# Patient Record
Sex: MALE | Race: WHITE | NOT HISPANIC OR LATINO | URBAN - METROPOLITAN AREA
[De-identification: names, ages, dates, MRNs, and addresses within clinical notes are randomized per-mention and may not be internally consistent; named-entity substitution may affect disease eponyms.]

---

## 2017-09-01 ENCOUNTER — OUTPATIENT (OUTPATIENT)
Dept: OUTPATIENT SERVICES | Facility: HOSPITAL | Age: 22
LOS: 1 days | End: 2017-09-01
Payer: MEDICAID

## 2017-09-05 DIAGNOSIS — R69 ILLNESS, UNSPECIFIED: ICD-10-CM

## 2017-09-27 ENCOUNTER — RESULT REVIEW (OUTPATIENT)
Age: 22
End: 2017-09-27

## 2017-09-27 ENCOUNTER — INPATIENT (INPATIENT)
Facility: HOSPITAL | Age: 22
LOS: 4 days | Discharge: HOME | DRG: 853 | End: 2017-10-02
Attending: SURGERY | Admitting: SURGERY
Payer: MEDICAID

## 2017-09-27 VITALS
OXYGEN SATURATION: 98 % | DIASTOLIC BLOOD PRESSURE: 79 MMHG | RESPIRATION RATE: 17 BRPM | TEMPERATURE: 101 F | SYSTOLIC BLOOD PRESSURE: 126 MMHG | HEART RATE: 147 BPM

## 2017-09-27 DIAGNOSIS — K37 UNSPECIFIED APPENDICITIS: ICD-10-CM

## 2017-09-27 LAB
ALBUMIN SERPL ELPH-MCNC: 4.2 G/DL — SIGNIFICANT CHANGE UP (ref 3.3–5)
ALP SERPL-CCNC: 123 U/L — HIGH (ref 40–120)
ALT FLD-CCNC: 17 U/L RC — SIGNIFICANT CHANGE UP (ref 10–45)
ANION GAP SERPL CALC-SCNC: 14 MMOL/L — SIGNIFICANT CHANGE UP (ref 5–17)
APPEARANCE UR: CLEAR — SIGNIFICANT CHANGE UP
APTT BLD: 26.3 SEC — LOW (ref 27.5–37.4)
AST SERPL-CCNC: 17 U/L — SIGNIFICANT CHANGE UP (ref 10–40)
BASE EXCESS BLDV CALC-SCNC: 0.6 MMOL/L — SIGNIFICANT CHANGE UP (ref -2–2)
BASOPHILS # BLD AUTO: 0 K/UL — SIGNIFICANT CHANGE UP (ref 0–0.2)
BILIRUB SERPL-MCNC: 0.9 MG/DL — SIGNIFICANT CHANGE UP (ref 0.2–1.2)
BILIRUB UR-MCNC: NEGATIVE — SIGNIFICANT CHANGE UP
BLD GP AB SCN SERPL QL: NEGATIVE — SIGNIFICANT CHANGE UP
BUN SERPL-MCNC: 10 MG/DL — SIGNIFICANT CHANGE UP (ref 7–23)
CA-I SERPL-SCNC: 1.16 MMOL/L — SIGNIFICANT CHANGE UP (ref 1.12–1.3)
CALCIUM SERPL-MCNC: 9.1 MG/DL — SIGNIFICANT CHANGE UP (ref 8.4–10.5)
CHLORIDE BLDV-SCNC: 104 MMOL/L — SIGNIFICANT CHANGE UP (ref 96–108)
CHLORIDE SERPL-SCNC: 101 MMOL/L — SIGNIFICANT CHANGE UP (ref 96–108)
CO2 BLDV-SCNC: 25 MMOL/L — SIGNIFICANT CHANGE UP (ref 22–30)
CO2 SERPL-SCNC: 23 MMOL/L — SIGNIFICANT CHANGE UP (ref 22–31)
COLOR SPEC: YELLOW — SIGNIFICANT CHANGE UP
CREAT SERPL-MCNC: 1.1 MG/DL — SIGNIFICANT CHANGE UP (ref 0.5–1.3)
DIFF PNL FLD: NEGATIVE — SIGNIFICANT CHANGE UP
EOSINOPHIL # BLD AUTO: 0 K/UL — SIGNIFICANT CHANGE UP (ref 0–0.5)
GAS PNL BLDV: 136 MMOL/L — SIGNIFICANT CHANGE UP (ref 136–145)
GAS PNL BLDV: SIGNIFICANT CHANGE UP
GLUCOSE BLDV-MCNC: 154 MG/DL — HIGH (ref 70–99)
GLUCOSE SERPL-MCNC: 154 MG/DL — HIGH (ref 70–99)
GLUCOSE UR QL: NEGATIVE MG/DL — SIGNIFICANT CHANGE UP
HCO3 BLDV-SCNC: 24 MMOL/L — SIGNIFICANT CHANGE UP (ref 21–29)
HCT VFR BLD CALC: 43.9 % — SIGNIFICANT CHANGE UP (ref 39–50)
HCT VFR BLDA CALC: 43 % — SIGNIFICANT CHANGE UP (ref 39–50)
HGB BLD CALC-MCNC: 14.1 G/DL — SIGNIFICANT CHANGE UP (ref 13–17)
HGB BLD-MCNC: 14.3 G/DL — SIGNIFICANT CHANGE UP (ref 13–17)
INR BLD: 1.32 RATIO — HIGH (ref 0.88–1.16)
KETONES UR-MCNC: NEGATIVE — SIGNIFICANT CHANGE UP
LACTATE BLDV-MCNC: 2.7 MMOL/L — HIGH (ref 0.7–2)
LEUKOCYTE ESTERASE UR-ACNC: NEGATIVE — SIGNIFICANT CHANGE UP
LG PLATELETS BLD QL AUTO: SLIGHT — SIGNIFICANT CHANGE UP
LIDOCAIN IGE QN: 17 U/L — SIGNIFICANT CHANGE UP (ref 7–60)
LYMPHOCYTES # BLD AUTO: 0.4 K/UL — LOW (ref 1–3.3)
LYMPHOCYTES # BLD AUTO: 7 % — LOW (ref 13–44)
MCHC RBC-ENTMCNC: 30.7 PG — SIGNIFICANT CHANGE UP (ref 27–34)
MCHC RBC-ENTMCNC: 32.5 GM/DL — SIGNIFICANT CHANGE UP (ref 32–36)
MCV RBC AUTO: 94.3 FL — SIGNIFICANT CHANGE UP (ref 80–100)
MONOCYTES # BLD AUTO: 0.1 K/UL — SIGNIFICANT CHANGE UP (ref 0–0.9)
NEUTROPHILS # BLD AUTO: 7.2 K/UL — SIGNIFICANT CHANGE UP (ref 1.8–7.4)
NEUTROPHILS NFR BLD AUTO: 84 % — HIGH (ref 43–77)
NEUTS BAND # BLD: 9 % — HIGH (ref 0–8)
NITRITE UR-MCNC: NEGATIVE — SIGNIFICANT CHANGE UP
PCO2 BLDV: 38 MMHG — SIGNIFICANT CHANGE UP (ref 35–50)
PH BLDV: 7.43 — SIGNIFICANT CHANGE UP (ref 7.35–7.45)
PH UR: 6.5 — SIGNIFICANT CHANGE UP (ref 5–8)
PLAT MORPH BLD: NORMAL — SIGNIFICANT CHANGE UP
PLATELET # BLD AUTO: 124 K/UL — LOW (ref 150–400)
PO2 BLDV: 38 MMHG — SIGNIFICANT CHANGE UP (ref 25–45)
POTASSIUM BLDV-SCNC: 3.6 MMOL/L — SIGNIFICANT CHANGE UP (ref 3.5–5)
POTASSIUM SERPL-MCNC: 3.9 MMOL/L — SIGNIFICANT CHANGE UP (ref 3.5–5.3)
POTASSIUM SERPL-SCNC: 3.9 MMOL/L — SIGNIFICANT CHANGE UP (ref 3.5–5.3)
PROT SERPL-MCNC: 7.8 G/DL — SIGNIFICANT CHANGE UP (ref 6–8.3)
PROT UR-MCNC: NEGATIVE MG/DL — SIGNIFICANT CHANGE UP
PROTHROM AB SERPL-ACNC: 14.5 SEC — HIGH (ref 9.8–12.7)
RBC # BLD: 4.65 M/UL — SIGNIFICANT CHANGE UP (ref 4.2–5.8)
RBC # FLD: 11.9 % — SIGNIFICANT CHANGE UP (ref 10.3–14.5)
RBC BLD AUTO: NORMAL — SIGNIFICANT CHANGE UP
RH IG SCN BLD-IMP: POSITIVE — SIGNIFICANT CHANGE UP
SAO2 % BLDV: 71 % — SIGNIFICANT CHANGE UP (ref 67–88)
SODIUM SERPL-SCNC: 138 MMOL/L — SIGNIFICANT CHANGE UP (ref 135–145)
SP GR SPEC: 1.05 — HIGH (ref 1.01–1.02)
UROBILINOGEN FLD QL: NEGATIVE MG/DL — SIGNIFICANT CHANGE UP
WBC # BLD: 7.7 K/UL — SIGNIFICANT CHANGE UP (ref 3.8–10.5)
WBC # FLD AUTO: 7.7 K/UL — SIGNIFICANT CHANGE UP (ref 3.8–10.5)

## 2017-09-27 PROCEDURE — 88304 TISSUE EXAM BY PATHOLOGIST: CPT | Mod: 26

## 2017-09-27 PROCEDURE — 88360 TUMOR IMMUNOHISTOCHEM/MANUAL: CPT | Mod: 26

## 2017-09-27 PROCEDURE — 74177 CT ABD & PELVIS W/CONTRAST: CPT | Mod: 26

## 2017-09-27 PROCEDURE — 99285 EMERGENCY DEPT VISIT HI MDM: CPT | Mod: 25

## 2017-09-27 PROCEDURE — 88342 IMHCHEM/IMCYTCHM 1ST ANTB: CPT | Mod: 26,59

## 2017-09-27 PROCEDURE — 44970 LAPAROSCOPY APPENDECTOMY: CPT

## 2017-09-27 PROCEDURE — 88305 TISSUE EXAM BY PATHOLOGIST: CPT | Mod: 26

## 2017-09-27 RX ORDER — ONDANSETRON 8 MG/1
4 TABLET, FILM COATED ORAL EVERY 4 HOURS
Qty: 0 | Refills: 0 | Status: DISCONTINUED | OUTPATIENT
Start: 2017-09-27 | End: 2017-09-27

## 2017-09-27 RX ORDER — ACETAMINOPHEN 500 MG
1000 TABLET ORAL ONCE
Qty: 0 | Refills: 0 | Status: COMPLETED | OUTPATIENT
Start: 2017-09-27 | End: 2017-09-27

## 2017-09-27 RX ORDER — SODIUM CHLORIDE 9 MG/ML
1000 INJECTION INTRAMUSCULAR; INTRAVENOUS; SUBCUTANEOUS ONCE
Qty: 0 | Refills: 0 | Status: COMPLETED | OUTPATIENT
Start: 2017-09-27 | End: 2017-09-27

## 2017-09-27 RX ORDER — HEPARIN SODIUM 5000 [USP'U]/ML
5000 INJECTION INTRAVENOUS; SUBCUTANEOUS EVERY 8 HOURS
Qty: 0 | Refills: 0 | Status: DISCONTINUED | OUTPATIENT
Start: 2017-09-27 | End: 2017-09-27

## 2017-09-27 RX ORDER — OXYCODONE AND ACETAMINOPHEN 5; 325 MG/1; MG/1
2 TABLET ORAL EVERY 6 HOURS
Qty: 0 | Refills: 0 | Status: DISCONTINUED | OUTPATIENT
Start: 2017-09-27 | End: 2017-10-02

## 2017-09-27 RX ORDER — SODIUM CHLORIDE 9 MG/ML
1000 INJECTION, SOLUTION INTRAVENOUS
Qty: 0 | Refills: 0 | Status: DISCONTINUED | OUTPATIENT
Start: 2017-09-27 | End: 2017-09-28

## 2017-09-27 RX ORDER — ONDANSETRON 8 MG/1
4 TABLET, FILM COATED ORAL ONCE
Qty: 0 | Refills: 0 | Status: COMPLETED | OUTPATIENT
Start: 2017-09-27 | End: 2017-09-27

## 2017-09-27 RX ORDER — MORPHINE SULFATE 50 MG/1
4 CAPSULE, EXTENDED RELEASE ORAL ONCE
Qty: 0 | Refills: 0 | Status: DISCONTINUED | OUTPATIENT
Start: 2017-09-27 | End: 2017-09-27

## 2017-09-27 RX ORDER — OXYCODONE AND ACETAMINOPHEN 5; 325 MG/1; MG/1
1 TABLET ORAL EVERY 4 HOURS
Qty: 0 | Refills: 0 | Status: DISCONTINUED | OUTPATIENT
Start: 2017-09-27 | End: 2017-10-02

## 2017-09-27 RX ORDER — HYDROMORPHONE HYDROCHLORIDE 2 MG/ML
0.25 INJECTION INTRAMUSCULAR; INTRAVENOUS; SUBCUTANEOUS
Qty: 0 | Refills: 0 | Status: DISCONTINUED | OUTPATIENT
Start: 2017-09-27 | End: 2017-09-28

## 2017-09-27 RX ORDER — CEFOTETAN DISODIUM 1 G
2 VIAL (EA) INJECTION EVERY 12 HOURS
Qty: 0 | Refills: 0 | Status: DISCONTINUED | OUTPATIENT
Start: 2017-09-27 | End: 2017-09-27

## 2017-09-27 RX ORDER — ENOXAPARIN SODIUM 100 MG/ML
40 INJECTION SUBCUTANEOUS EVERY 24 HOURS
Qty: 0 | Refills: 0 | Status: DISCONTINUED | OUTPATIENT
Start: 2017-09-27 | End: 2017-10-02

## 2017-09-27 RX ORDER — HYDROMORPHONE HYDROCHLORIDE 2 MG/ML
0.2 INJECTION INTRAMUSCULAR; INTRAVENOUS; SUBCUTANEOUS
Qty: 0 | Refills: 0 | Status: DISCONTINUED | OUTPATIENT
Start: 2017-09-27 | End: 2017-09-27

## 2017-09-27 RX ORDER — PIPERACILLIN AND TAZOBACTAM 4; .5 G/20ML; G/20ML
3.38 INJECTION, POWDER, LYOPHILIZED, FOR SOLUTION INTRAVENOUS EVERY 8 HOURS
Qty: 0 | Refills: 0 | Status: DISCONTINUED | OUTPATIENT
Start: 2017-09-27 | End: 2017-09-28

## 2017-09-27 RX ORDER — SODIUM CHLORIDE 9 MG/ML
1000 INJECTION, SOLUTION INTRAVENOUS
Qty: 0 | Refills: 0 | Status: DISCONTINUED | OUTPATIENT
Start: 2017-09-27 | End: 2017-09-27

## 2017-09-27 RX ORDER — PIPERACILLIN AND TAZOBACTAM 4; .5 G/20ML; G/20ML
3.38 INJECTION, POWDER, LYOPHILIZED, FOR SOLUTION INTRAVENOUS ONCE
Qty: 0 | Refills: 0 | Status: COMPLETED | OUTPATIENT
Start: 2017-09-27 | End: 2017-09-27

## 2017-09-27 RX ORDER — MORPHINE SULFATE 50 MG/1
2 CAPSULE, EXTENDED RELEASE ORAL EVERY 4 HOURS
Qty: 0 | Refills: 0 | Status: DISCONTINUED | OUTPATIENT
Start: 2017-09-27 | End: 2017-09-27

## 2017-09-27 RX ADMIN — Medication 400 MILLIGRAM(S): at 21:30

## 2017-09-27 RX ADMIN — PIPERACILLIN AND TAZOBACTAM 25 GRAM(S): 4; .5 INJECTION, POWDER, LYOPHILIZED, FOR SOLUTION INTRAVENOUS at 21:51

## 2017-09-27 RX ADMIN — ONDANSETRON 4 MILLIGRAM(S): 8 TABLET, FILM COATED ORAL at 08:27

## 2017-09-27 RX ADMIN — ENOXAPARIN SODIUM 40 MILLIGRAM(S): 100 INJECTION SUBCUTANEOUS at 21:30

## 2017-09-27 RX ADMIN — OXYCODONE AND ACETAMINOPHEN 2 TABLET(S): 5; 325 TABLET ORAL at 20:32

## 2017-09-27 RX ADMIN — MORPHINE SULFATE 4 MILLIGRAM(S): 50 CAPSULE, EXTENDED RELEASE ORAL at 08:40

## 2017-09-27 RX ADMIN — HYDROMORPHONE HYDROCHLORIDE 0.25 MILLIGRAM(S): 2 INJECTION INTRAMUSCULAR; INTRAVENOUS; SUBCUTANEOUS at 17:15

## 2017-09-27 RX ADMIN — PIPERACILLIN AND TAZOBACTAM 200 GRAM(S): 4; .5 INJECTION, POWDER, LYOPHILIZED, FOR SOLUTION INTRAVENOUS at 08:47

## 2017-09-27 RX ADMIN — HYDROMORPHONE HYDROCHLORIDE 0.25 MILLIGRAM(S): 2 INJECTION INTRAMUSCULAR; INTRAVENOUS; SUBCUTANEOUS at 17:03

## 2017-09-27 RX ADMIN — HYDROMORPHONE HYDROCHLORIDE 0.25 MILLIGRAM(S): 2 INJECTION INTRAMUSCULAR; INTRAVENOUS; SUBCUTANEOUS at 17:32

## 2017-09-27 RX ADMIN — SODIUM CHLORIDE 2000 MILLILITER(S): 9 INJECTION INTRAMUSCULAR; INTRAVENOUS; SUBCUTANEOUS at 08:20

## 2017-09-27 RX ADMIN — SODIUM CHLORIDE 125 MILLILITER(S): 9 INJECTION, SOLUTION INTRAVENOUS at 10:58

## 2017-09-27 RX ADMIN — OXYCODONE AND ACETAMINOPHEN 2 TABLET(S): 5; 325 TABLET ORAL at 20:02

## 2017-09-27 RX ADMIN — MORPHINE SULFATE 4 MILLIGRAM(S): 50 CAPSULE, EXTENDED RELEASE ORAL at 08:27

## 2017-09-27 RX ADMIN — Medication 400 MILLIGRAM(S): at 08:20

## 2017-09-27 NOTE — PROGRESS NOTE ADULT - SUBJECTIVE AND OBJECTIVE BOX
STATUS POST:  Lap appy    SUBJECTIVE: Pt seen  SOB:  [ ] YES [x ] NO  Chest Discomfort: [ ] YES [ x] NO    Nausea: [ ] YES [ x] NO           Vomiting: [ ] YES [x ] NO  Flatus: [ ] YES [x ] NO             Bowel Movement: [ ] YES [ x] NO  Diarrhea: [ ] YES [ x] NO                  Pain Control Adequate: [x ] YES [ ] NO  Yun: x  NGT: x    Vital Signs Last 24 Hrs  T(C): 37.6 (27 Sep 2017 19:40), Max: 39.5 (27 Sep 2017 08:14)  T(F): 99.6 (27 Sep 2017 19:40), Max: 103.1 (27 Sep 2017 08:14)  HR: 98 (27 Sep 2017 19:40) (74 - 147)  BP: 111/75 (27 Sep 2017 19:40) (103/69 - 134/68)  BP(mean): 85 (27 Sep 2017 18:15) (82 - 92)  RR: 18 (27 Sep 2017 19:40) (14 - 20)  SpO2: 97% (27 Sep 2017 19:40) (96% - 100%)  I&O's Summary    27 Sep 2017 07:  -  27 Sep 2017 20:15  --------------------------------------------------------  IN: 401 mL / OUT: 500 mL / NET: -99 mL      I&O's Detail    27 Sep 2017 07:  -  27 Sep 2017 20:15  --------------------------------------------------------  IN:    lactated ringers.: 300 mL    Oral Fluid: 101 mL  Total IN: 401 mL    OUT:    Voided: 500 mL  Total OUT: 500 mL    Total NET: -99 mL          MEDICATIONS  (STANDING):  enoxaparin Injectable 40 milliGRAM(s) SubCutaneous every 24 hours  lactated ringers. 1000 milliLiter(s) (100 mL/Hr) IV Continuous <Continuous>  piperacillin/tazobactam IVPB. 3.375 Gram(s) IV Intermittent every 8 hours    MEDICATIONS  (PRN):  oxyCODONE    5 mG/acetaminophen 325 mG 1 Tablet(s) Oral every 4 hours PRN Moderate Pain  oxyCODONE    5 mG/acetaminophen 325 mG 2 Tablet(s) Oral every 6 hours PRN Severe Pain  HYDROmorphone  Injectable 0.25 milliGRAM(s) IV Push every 10 minutes PRN Moderate Pain      LABS:                        14.3   7.7   )-----------( 124      ( 27 Sep 2017 08:30 )             43.9         138  |  101  |  10  ----------------------------<  154<H>  3.9   |  23  |  1.10    Ca    9.1      27 Sep 2017 08:30    TPro  7.8  /  Alb  4.2  /  TBili  0.9  /  DBili  x   /  AST  17  /  ALT  17  /  AlkPhos  123<H>      PT/INR - ( 27 Sep 2017 08:30 )   PT: 14.5 sec;   INR: 1.32 ratio         PTT - ( 27 Sep 2017 08:30 )  PTT:26.3 sec  Urinalysis Basic - ( 27 Sep 2017 12:41 )    Color: Yellow / Appearance: Clear / S.050 / pH: x  Gluc: x / Ketone: Negative  / Bili: Negative / Urobili: Negative mg/dL   Blood: x / Protein: Negative mg/dL / Nitrite: Negative   Leuk Esterase: Negative / RBC: x / WBC x   Sq Epi: x / Non Sq Epi: x / Bacteria: x    Physical Exam:    General: WN/WD NAD  Neurology: A&Ox3, nonfocal, follows commands  Eyes: EOMI  Abdominal: Obese, Soft, NT, ND,   Extremities: No edema, + peripheral pulses  Skin: No Rashes, Hematoma, Ecchymosis  Incisions: Laparoscopic incisions w/ dressings in place. Umbilical incision w/ moderate serosanguinous staining      A/P: 22y Male s/p the above procedure doing well. Pain control adequate. Has been tolerating a CLD    - Pain control  - Diet: CLD; advance in AM  - Activity: OOB as tolerated  - Labs: AM Labs  - DVT ppx  - Monitor GI and  function  - Dispo: Floor; discharge tomorrow if afebrile, tolerating regular diet, and pain control adequate    Rick Black M.D.

## 2017-09-27 NOTE — H&P ADULT - NSHPLABSRESULTS_GEN_ALL_CORE
14.3   7.7   )-----------( 124      ( 27 Sep 2017 08:30 )             43.9   09-27    138  |  101  |  10  ----------------------------<  154<H>  3.9   |  23  |  1.10    Ca    9.1      27 Sep 2017 08:30    TPro  7.8  /  Alb  4.2  /  TBili  0.9  /  DBili  x   /  AST  17  /  ALT  17  /  AlkPhos  123<H>  09-27

## 2017-09-27 NOTE — H&P ADULT - ASSESSMENT
22 year old male with acute appendicitis   -Admit to OR under under Dr. Cid for laparoscopic appendectomy, possible open  -Patient consented and in the chart  -NPO, IV fluids, IV antibiotics  -CT scan reviewed with radiology shows evidence of acute appendicitis with fecolith without signs of perforation  -Heparin SubQ for DVT prophylaxis

## 2017-09-27 NOTE — ED PROVIDER NOTE - ATTENDING CONTRIBUTION TO CARE
Private Physician "Can't recall name"  22y male pmh, neg, Pt comes to ed complains of abd pain onset two days ago. RLQ rad to suprapubic, Anorexia with nausea vomiting, No diarrhea, No melena/brbpr/hemetmeis. Pain worse with palp walking. PE WDWN male looking acutely ill NCAT neck supple chest clear anterior & posterior abd,. Abd rlq tender with rebound, +bs , no rug ttp. neuro no focal defects  Mac Anders MD, Facep

## 2017-09-27 NOTE — H&P ADULT - HISTORY OF PRESENT ILLNESS
22 year old male with no past medical history presents to the emergency complaining of abdominal pain.  Pain started two days ago in the epigastric region, acutely worsening last night and localizing to the right lower quadrant.  Pain is described as sharp and stabbing in nature without any aggravating or relieving factors.  Patient reports nausea and non bloody/non billious emesis starting last night.  No changes in bowel habits.  Fevers started last night, patient is currently 103.1 in the ED.

## 2017-09-27 NOTE — BRIEF OPERATIVE NOTE - OPERATION/FINDINGS
Perforation seen at base of appendix. Mild purulence in peritoneal cavity. Appendectomy performed and abdomen washed out. Umbilical skin wound left open and packed.

## 2017-09-27 NOTE — ED PROVIDER NOTE - OBJECTIVE STATEMENT
22yom no pmhx here with RLQ pain since 2 days worsening along with fever and chills, anorexia. No nausea or vomiting. seen at [facility] 2 nights ago for gastritis.

## 2017-09-27 NOTE — ED ADULT NURSE NOTE - OBJECTIVE STATEMENT
Patient   is  alert  and  oriented x3.  Color  is  good  and  skin warm to  touch.  He  is  nausea, vomiting, fever and  lower  abdominal pain.  He  is  tachycardic  upon  to  ER  arrival  .

## 2017-09-27 NOTE — H&P ADULT - NSHPPHYSICALEXAM_GEN_ALL_CORE
General-  Sitting up in bed, appears comfortable  Chest- Breath sounds audible bilaterally; no wheezing, rales or ronchi  Cardiac- S1, S2; no murmurs, rubs or gallops  Abdomen- Soft, tender to palpation in the right lower quadrant, non distended; positive Rosvig sign  Extremities- Warm, well perfused

## 2017-09-27 NOTE — BRIEF OPERATIVE NOTE - PROCEDURE
<<-----Click on this checkbox to enter Procedure Laparoscopic appendectomy  09/27/2017    Active  CBEHR

## 2017-09-28 ENCOUNTER — TRANSCRIPTION ENCOUNTER (OUTPATIENT)
Age: 22
End: 2017-09-28

## 2017-09-28 LAB
ANION GAP SERPL CALC-SCNC: 12 MMOL/L — SIGNIFICANT CHANGE UP (ref 5–17)
BASOPHILS # BLD AUTO: 0 K/UL — SIGNIFICANT CHANGE UP (ref 0–0.2)
BASOPHILS NFR BLD AUTO: 0.1 % — SIGNIFICANT CHANGE UP (ref 0–2)
BUN SERPL-MCNC: 9 MG/DL — SIGNIFICANT CHANGE UP (ref 7–23)
CALCIUM SERPL-MCNC: 8.3 MG/DL — LOW (ref 8.4–10.5)
CHLORIDE SERPL-SCNC: 102 MMOL/L — SIGNIFICANT CHANGE UP (ref 96–108)
CO2 SERPL-SCNC: 25 MMOL/L — SIGNIFICANT CHANGE UP (ref 22–31)
CREAT SERPL-MCNC: 0.86 MG/DL — SIGNIFICANT CHANGE UP (ref 0.5–1.3)
E COLI DNA BLD POS QL NAA+NON-PROBE: SIGNIFICANT CHANGE UP
EOSINOPHIL # BLD AUTO: 0.1 K/UL — SIGNIFICANT CHANGE UP (ref 0–0.5)
EOSINOPHIL NFR BLD AUTO: 0.5 % — SIGNIFICANT CHANGE UP (ref 0–6)
GLUCOSE SERPL-MCNC: 111 MG/DL — HIGH (ref 70–99)
GRAM STN FLD: SIGNIFICANT CHANGE UP
HCT VFR BLD CALC: 39 % — SIGNIFICANT CHANGE UP (ref 39–50)
HGB BLD-MCNC: 12.8 G/DL — LOW (ref 13–17)
LYMPHOCYTES # BLD AUTO: 1 K/UL — SIGNIFICANT CHANGE UP (ref 1–3.3)
LYMPHOCYTES # BLD AUTO: 10.1 % — LOW (ref 13–44)
MCHC RBC-ENTMCNC: 31.6 PG — SIGNIFICANT CHANGE UP (ref 27–34)
MCHC RBC-ENTMCNC: 32.7 GM/DL — SIGNIFICANT CHANGE UP (ref 32–36)
MCV RBC AUTO: 96.5 FL — SIGNIFICANT CHANGE UP (ref 80–100)
METHOD TYPE: SIGNIFICANT CHANGE UP
MONOCYTES # BLD AUTO: 0.7 K/UL — SIGNIFICANT CHANGE UP (ref 0–0.9)
MONOCYTES NFR BLD AUTO: 7.5 % — SIGNIFICANT CHANGE UP (ref 2–14)
NEUTROPHILS # BLD AUTO: 8.2 K/UL — HIGH (ref 1.8–7.4)
NEUTROPHILS NFR BLD AUTO: 81.9 % — HIGH (ref 43–77)
PLATELET # BLD AUTO: 103 K/UL — LOW (ref 150–400)
POTASSIUM SERPL-MCNC: 3.9 MMOL/L — SIGNIFICANT CHANGE UP (ref 3.5–5.3)
POTASSIUM SERPL-SCNC: 3.9 MMOL/L — SIGNIFICANT CHANGE UP (ref 3.5–5.3)
RBC # BLD: 4.04 M/UL — LOW (ref 4.2–5.8)
RBC # FLD: 11.9 % — SIGNIFICANT CHANGE UP (ref 10.3–14.5)
SODIUM SERPL-SCNC: 139 MMOL/L — SIGNIFICANT CHANGE UP (ref 135–145)
WBC # BLD: 10 K/UL — SIGNIFICANT CHANGE UP (ref 3.8–10.5)
WBC # FLD AUTO: 10 K/UL — SIGNIFICANT CHANGE UP (ref 3.8–10.5)

## 2017-09-28 RX ORDER — DOCUSATE SODIUM 100 MG
1 CAPSULE ORAL
Qty: 0 | Refills: 0 | COMMUNITY
Start: 2017-09-28

## 2017-09-28 RX ORDER — DOCUSATE SODIUM 100 MG
100 CAPSULE ORAL THREE TIMES A DAY
Qty: 0 | Refills: 0 | Status: DISCONTINUED | OUTPATIENT
Start: 2017-09-28 | End: 2017-10-02

## 2017-09-28 RX ORDER — PIPERACILLIN AND TAZOBACTAM 4; .5 G/20ML; G/20ML
3.38 INJECTION, POWDER, LYOPHILIZED, FOR SOLUTION INTRAVENOUS EVERY 8 HOURS
Qty: 0 | Refills: 0 | Status: DISCONTINUED | OUTPATIENT
Start: 2017-09-28 | End: 2017-09-30

## 2017-09-28 RX ORDER — SENNA PLUS 8.6 MG/1
2 TABLET ORAL AT BEDTIME
Qty: 0 | Refills: 0 | Status: DISCONTINUED | OUTPATIENT
Start: 2017-09-28 | End: 2017-10-02

## 2017-09-28 RX ORDER — SENNA PLUS 8.6 MG/1
2 TABLET ORAL
Qty: 0 | Refills: 0 | COMMUNITY
Start: 2017-09-28

## 2017-09-28 RX ORDER — OXYCODONE HYDROCHLORIDE 5 MG/1
1 TABLET ORAL
Qty: 30 | Refills: 0 | OUTPATIENT
Start: 2017-09-28 | End: 2017-10-05

## 2017-09-28 RX ADMIN — OXYCODONE AND ACETAMINOPHEN 1 TABLET(S): 5; 325 TABLET ORAL at 12:20

## 2017-09-28 RX ADMIN — ENOXAPARIN SODIUM 40 MILLIGRAM(S): 100 INJECTION SUBCUTANEOUS at 22:17

## 2017-09-28 RX ADMIN — Medication 100 MILLIGRAM(S): at 22:17

## 2017-09-28 RX ADMIN — Medication 100 MILLIGRAM(S): at 14:27

## 2017-09-28 RX ADMIN — OXYCODONE AND ACETAMINOPHEN 2 TABLET(S): 5; 325 TABLET ORAL at 06:21

## 2017-09-28 RX ADMIN — SENNA PLUS 2 TABLET(S): 8.6 TABLET ORAL at 22:17

## 2017-09-28 RX ADMIN — PIPERACILLIN AND TAZOBACTAM 25 GRAM(S): 4; .5 INJECTION, POWDER, LYOPHILIZED, FOR SOLUTION INTRAVENOUS at 06:34

## 2017-09-28 RX ADMIN — OXYCODONE AND ACETAMINOPHEN 2 TABLET(S): 5; 325 TABLET ORAL at 16:19

## 2017-09-28 RX ADMIN — PIPERACILLIN AND TAZOBACTAM 25 GRAM(S): 4; .5 INJECTION, POWDER, LYOPHILIZED, FOR SOLUTION INTRAVENOUS at 22:17

## 2017-09-28 RX ADMIN — OXYCODONE AND ACETAMINOPHEN 1 TABLET(S): 5; 325 TABLET ORAL at 13:10

## 2017-09-28 RX ADMIN — Medication 1 TABLET(S): at 18:22

## 2017-09-28 RX ADMIN — OXYCODONE AND ACETAMINOPHEN 2 TABLET(S): 5; 325 TABLET ORAL at 06:49

## 2017-09-28 RX ADMIN — OXYCODONE AND ACETAMINOPHEN 2 TABLET(S): 5; 325 TABLET ORAL at 17:05

## 2017-09-28 NOTE — PROGRESS NOTE ADULT - ASSESSMENT
A/P: 22y Male POD 1 lap. appendectomy. Pain control adequate. Tolerating regular diet. Voiding appropriately.    - analgesia: continue to monitor  - Diet: maintain regular diet, continue to monitor bowel fxn  - antibiotics: continue zosyn 2/2 perforated appendix + abdominal washout  - Activity: OOB as tolerated  - DVT ppx  - Dispo: possible d/c today

## 2017-09-28 NOTE — DISCHARGE NOTE ADULT - PATIENT PORTAL LINK FT
“You can access the [facility] Patient Portal, offered by [facility], by registering with the following website: [email or web address]”

## 2017-09-28 NOTE — PROGRESS NOTE ADULT - SUBJECTIVE AND OBJECTIVE BOX
Subjective:  Pt is POD 1 lap. appendectomy. Pt has not passed flatus yet but is starting to eat food. Voiding appropriately. No c/o pain.    Objective:  T(C): 37.2 (09-28-17 @ 06:11), Max: 37.8 (09-27-17 @ 20:44)  HR: 77 (09-28-17 @ 06:11) (64 - 98)  BP: 106/72 (09-28-17 @ 06:11) (103/69 - 134/68)  RR: 18 (09-28-17 @ 06:11) (14 - 18)  SpO2: 97% (09-28-17 @ 06:11) (96% - 100%)  Wt(kg): --    09-27 @ 07:01  -  09-28 @ 07:00  --------------------------------------------------------  IN:    lactated ringers.: 1500 mL    Oral Fluid: 581 mL  Total IN: 2081 mL    OUT:    Voided: 900 mL  Total OUT: 900 mL    Total NET: 1181 mL      09-28 @ 07:01  -  09-28 @ 10:41  --------------------------------------------------------  IN:    Oral Fluid: 240 mL  Total IN: 240 mL    OUT:  Total OUT: 0 mL    Total NET: 240 mL      MEDICATIONS  (STANDING):  enoxaparin Injectable 40 milliGRAM(s) SubCutaneous every 24 hours  piperacillin/tazobactam IVPB. 3.375 Gram(s) IV Intermittent every 8 hours    MEDICATIONS  (PRN):  oxyCODONE    5 mG/acetaminophen 325 mG 1 Tablet(s) Oral every 4 hours PRN Moderate Pain  oxyCODONE    5 mG/acetaminophen 325 mG 2 Tablet(s) Oral every 6 hours PRN Severe Pain      LABS:                                   12.8   10.0  )-----------( 103      ( 28 Sep 2017 07:09 )             39.0     09-28    139  |  102  |  9   ----------------------------<  111<H>  3.9   |  25  |  0.86    Ca    8.3<L>      28 Sep 2017 07:09          Physical Exam:    General: laying comfortably in bed, NAD  Abdominal: belly soft, mildly tender to palpation, no rebound  eyes: no scleral icterus   Extremities: No edema, + peripheral pulses  Skin: midline incision packed with Kerlix and covered with gauze, port sites C/D/I

## 2017-09-28 NOTE — DISCHARGE NOTE ADULT - CARE PROVIDER_API CALL
Juan Cid), Surgery; Surgical Critical Care  [address] [place] [address]  Phone: [phone]  Fax: [phone] Juan Cid), Surgery; Surgical Critical Care  [address] [place] [address]  Phone: [phone]  Fax: [phone]    Barrett Angel), Infectious Disease  [address] [place] [address]  Phone: [phone]  Fax: [phone]

## 2017-09-28 NOTE — DISCHARGE NOTE ADULT - PLAN OF CARE
Healing Activity- No heavy lifting or straining over 15 lbs for the next two weeks;  Driving- Please do not drive until your pain is well controlled and you do not need to take pain medications.  You may shower-Do not submerge or scrub incision sites.  Please pat dry incisions/dressings.  Leave the white steri strips in place, they will fall off on their own in approximately 5-7 days.  Please change packing daily with Kerlex or 1/2 inch packing.  Please remove packing before showering. Activity- No heavy lifting or straining over 15 lbs for the next two weeks;  Driving- Please do not drive until your pain is well controlled and you do not need to take pain medications.  You may shower-Do not submerge or scrub incision sites.  Please pat dry incisions/dressings.  Leave the white steri strips in place, they will fall off on their own in approximately 5-7 days.  Please change packing daily with Kerlex or 1/2 inch packing.  Please remove packing before showering.  Please follow up with Dr. Cid in 2 weeks. Contact info below.

## 2017-09-28 NOTE — DISCHARGE NOTE ADULT - INSTRUCTIONS
You may resume a regular diet. You may resume a regular diet.  Activity as tolerated.   NOTIFY YOUR SURGEON IF: You have any fever (over 100.4 F) or chills, persistent nausea/vomiting, persistent diarrhea, or if your pain is not controlled on your discharge pain medications. If you have persistent nausea, vomiting, pain, fever , unable to tolerate foods, liquids, unable to urinate , if you noticed any redness, tenderness, warmth, purulent drainage at the incision site call the doctor or go to the nearest hospital. change the dressing 2times a day keep the dressing clean, dry and intact.

## 2017-09-28 NOTE — DISCHARGE NOTE ADULT - HOSPITAL COURSE
22 year old male with no past medical history presents to the emergency complaining of abdominal pain.  Pain started two days ago in the epigastric region, acutely worsening last night and localizing to the right lower quadrant.  Pain is described as sharp and stabbing in nature without any aggravating or relieving factors.  Patient reports nausea and non bloody/non billious emesis starting last night.  No changes in bowel habits.  Fevers started last night, patient is currently 103.1 in the ED.  CT scan reviewed with radiology shows evidence of acute appendicitis with fecolith without signs of perforation. Pt went to the OR for a laparoscopic appendectomy. The patient tolerated the procedure well. There were no complications. Diet was advanced as tolerated. The patient's pain was controlled by IV pain medications and then by PO pain medications.   POD # 1, At the time of discharge, the patient was hemodynamically stable, was tolerating PO diet, was voiding urine, was ambulating, and was comfortable with adequate pain control. The patient was instructed to follow up with Dr. Cid within 1-2 weeks after discharge from the hospital. The patient/family felt comfortable with discharge. The patient was discharged to home with PO course of Abx for perforated appendicitis. The patient had no other issues.  Appendectomy performed and abdomen washed out. Umbilical skin wound left open and packed. 22 year old male with no past medical history presents to the emergency complaining of abdominal pain.  Pain started two days ago in the epigastric region, acutely worsening last night and localizing to the right lower quadrant.  Pain is described as sharp and stabbing in nature without any aggravating or relieving factors.  Patient reports nausea and non bloody/non billious emesis starting last night.  No changes in bowel habits.  Fevers started last night, patient is currently 103.1 in the ED.  CT scan reviewed with radiology shows evidence of acute appendicitis with fecolith without signs of perforation. Pt went to the OR for a laparoscopic appendectomy. The patient tolerated the procedure well. There were no complications. Diet was advanced as tolerated. The patient's pain was controlled by IV pain medications and then by PO pain medications.     Repeat CT scan revealed Status post appendectomy. Two small ill-defined fluid collections in the right lower quadrant, largest 4.0 x 2.8 x 3.0 cm. ID followed for ABX and IR was consulted but unable to drain collection.  Repeat blood cultures negative.     At the time of discharge, the patient was hemodynamically stable, was tolerating PO diet, was voiding urine, was ambulating, and was comfortable with adequate pain control. The patient was instructed to follow up with Dr. Cid within 1-2 weeks after discharge from the hospital. The patient felt comfortable with discharge. The patient was discharged to home with PO course of Abx for perforated appendicitis. The patient had no other issues.

## 2017-09-28 NOTE — DISCHARGE NOTE ADULT - OTHER SIGNIFICANT FINDINGS
EXAM:  CT ABDOMEN AND PELVIS IC                          PROCEDURE DATE:  09/27/2017      INTERPRETATION:  CLINICAL INFORMATION: Abdominal pain, fever, nausea for   2 days    COMPARISON: None.    FINDINGS:    LOWER CHEST: Within normal limits.    LIVER: Within normal limits.  BILE DUCTS: Normal caliber.  GALLBLADDER: Within normal limits.  SPLEEN: Within normal limits.  PANCREAS: Within normal limits.  ADRENALS: Within normal limits.  KIDNEYS/URETERS: Within normal limits.    BLADDER: Within normal limits.  REPRODUCTIVE ORGANS: Within normal limits.    BOWEL: Small hiatal hernia. Enlarged appendix with distal lumen measuring 1.1 cm containing intraluminal calcifications, representing an appendicolith. There is surrounding fat stranding, findings are consistent with appendicitis.   PERITONEUM: No ascites.  VESSELS:  Within normal limits.  RETROPERITONEUM: No lymphadenopathy.    ABDOMINAL WALL: Small bilateral fat-containing inguinal hernias.  BONES: Within normal limits.    IMPRESSION: Acute uncomplicated appendicitis with appendicoliths.

## 2017-09-28 NOTE — DISCHARGE NOTE ADULT - ADDITIONAL INSTRUCTIONS
Please call for a follow up appointment with your primary care medical doctor upon discharge regarding your recent surgery and hospitalization.  Please follow up with your surgeon Dr. Cid in 2 weeks. You will need a repeat CT scan to determine further Antibiotic plan.

## 2017-09-28 NOTE — DISCHARGE NOTE ADULT - MEDICATION SUMMARY - MEDICATIONS TO TAKE
I will START or STAY ON the medications listed below when I get home from the hospital:    acetaminophen-oxyCODONE 325 mg-5 mg oral tablet  -- 1-2  tab(s) by mouth every 4-6  hours, As needed for pain, MDD:8  -- Indication: For pain control    senna oral tablet  -- 2 tab(s) by mouth once a day (at bedtime)  -- Indication: For laxative    docusate sodium 100 mg oral capsule  -- 1 cap(s) by mouth 3 times a day  -- Indication: For laxative    Augmentin 875 mg-125 mg oral tablet  -- 1 tab(s) by mouth every 12 hours  -- Finish all this medication unless otherwise directed by prescriber.  Take with food or milk.    -- Indication: For Perforated Appendicitis I will START or STAY ON the medications listed below when I get home from the hospital:    metroNIDAZOLE 500 mg oral tablet  -- 1 tab(s) by mouth every 8 hours  -- Indication: For Appendicitis    acetaminophen-oxyCODONE 325 mg-5 mg oral tablet  -- 1-2  tab(s) by mouth every 4-6  hours, As needed for pain, MDD:8  -- Indication: For Pain    senna oral tablet  -- 2 tab(s) by mouth once a day (at bedtime)  -- Indication: For laxative    docusate sodium 100 mg oral capsule  -- 1 cap(s) by mouth 3 times a day  -- Indication: For laxative    levoFLOXacin 750 mg oral tablet  -- 1 tab(s) by mouth every 24 hours  -- Indication: For Appendicitis

## 2017-09-28 NOTE — DISCHARGE NOTE ADULT - CARE PLAN
Principal Discharge DX:	Acute appendicitis, unspecified acute appendicitis type  Goal:	Healing  Instructions for follow-up, activity and diet:	Activity- No heavy lifting or straining over 15 lbs for the next two weeks;  Driving- Please do not drive until your pain is well controlled and you do not need to take pain medications.  You may shower-Do not submerge or scrub incision sites.  Please pat dry incisions/dressings.  Leave the white steri strips in place, they will fall off on their own in approximately 5-7 days.  Please change packing daily with Kerlex or 1/2 inch packing.  Please remove packing before showering. Principal Discharge DX:	Acute appendicitis, unspecified acute appendicitis type  Goal:	Healing  Instructions for follow-up, activity and diet:	Activity- No heavy lifting or straining over 15 lbs for the next two weeks;  Driving- Please do not drive until your pain is well controlled and you do not need to take pain medications.  You may shower-Do not submerge or scrub incision sites.  Please pat dry incisions/dressings.  Leave the white steri strips in place, they will fall off on their own in approximately 5-7 days.  Please change packing daily with Kerlex or 1/2 inch packing.  Please remove packing before showering.  Please follow up with Dr. Cid in 2 weeks. Contact info below.

## 2017-09-29 LAB
GRAM STN FLD: SIGNIFICANT CHANGE UP
HCT VFR BLD CALC: 35 % — LOW (ref 39–50)
HGB BLD-MCNC: 11.8 G/DL — LOW (ref 13–17)
MCHC RBC-ENTMCNC: 31.7 PG — SIGNIFICANT CHANGE UP (ref 27–34)
MCHC RBC-ENTMCNC: 33.6 GM/DL — SIGNIFICANT CHANGE UP (ref 32–36)
MCV RBC AUTO: 94.6 FL — SIGNIFICANT CHANGE UP (ref 80–100)
PLATELET # BLD AUTO: 113 K/UL — LOW (ref 150–400)
RBC # BLD: 3.7 M/UL — LOW (ref 4.2–5.8)
RBC # FLD: 11.7 % — SIGNIFICANT CHANGE UP (ref 10.3–14.5)
SPECIMEN SOURCE: SIGNIFICANT CHANGE UP
WBC # BLD: 8.1 K/UL — SIGNIFICANT CHANGE UP (ref 3.8–10.5)
WBC # FLD AUTO: 8.1 K/UL — SIGNIFICANT CHANGE UP (ref 3.8–10.5)

## 2017-09-29 RX ADMIN — PIPERACILLIN AND TAZOBACTAM 25 GRAM(S): 4; .5 INJECTION, POWDER, LYOPHILIZED, FOR SOLUTION INTRAVENOUS at 21:54

## 2017-09-29 RX ADMIN — Medication 100 MILLIGRAM(S): at 05:30

## 2017-09-29 RX ADMIN — PIPERACILLIN AND TAZOBACTAM 25 GRAM(S): 4; .5 INJECTION, POWDER, LYOPHILIZED, FOR SOLUTION INTRAVENOUS at 15:18

## 2017-09-29 RX ADMIN — OXYCODONE AND ACETAMINOPHEN 2 TABLET(S): 5; 325 TABLET ORAL at 10:08

## 2017-09-29 RX ADMIN — SENNA PLUS 2 TABLET(S): 8.6 TABLET ORAL at 21:54

## 2017-09-29 RX ADMIN — OXYCODONE AND ACETAMINOPHEN 2 TABLET(S): 5; 325 TABLET ORAL at 18:46

## 2017-09-29 RX ADMIN — Medication 100 MILLIGRAM(S): at 15:18

## 2017-09-29 RX ADMIN — ENOXAPARIN SODIUM 40 MILLIGRAM(S): 100 INJECTION SUBCUTANEOUS at 21:54

## 2017-09-29 RX ADMIN — OXYCODONE AND ACETAMINOPHEN 2 TABLET(S): 5; 325 TABLET ORAL at 10:30

## 2017-09-29 RX ADMIN — OXYCODONE AND ACETAMINOPHEN 2 TABLET(S): 5; 325 TABLET ORAL at 19:30

## 2017-09-29 RX ADMIN — Medication 100 MILLIGRAM(S): at 21:54

## 2017-09-29 RX ADMIN — PIPERACILLIN AND TAZOBACTAM 25 GRAM(S): 4; .5 INJECTION, POWDER, LYOPHILIZED, FOR SOLUTION INTRAVENOUS at 05:30

## 2017-09-29 NOTE — PROGRESS NOTE ADULT - ASSESSMENT
A/P: 22y Male s/p lap. appendectomy. Pain control adequate. Tolerating regular diet. Voiding appropriately.    - Monitor GI fxn; awaiting ROBF  - Diet: maintain regular diet  - antibiotics: continue zosyn 2/2 perforated appendix + abdominal washout  - Activity: OOB as tolerated  - Pain control  - DVT ppx  - Dispo: discharge to home pending ROBF.    Rick Black M.D.

## 2017-09-29 NOTE — PROGRESS NOTE ADULT - SUBJECTIVE AND OBJECTIVE BOX
ATP Progress Note    S: No acute events overnight. Patient resting comfortably in bed. Pain well controlled. Denies fevers/chills and N/V. Denies flatus or bowel movement.    O:  T(C): 37.6 (09-29-17 @ 05:28), Max: 38.3 (09-28-17 @ 13:54)  HR: 88 (09-29-17 @ 05:28) (85 - 98)  BP: 112/72 (09-29-17 @ 05:28) (104/72 - 129/85)  RR: 18 (09-29-17 @ 05:28) (16 - 18)  SpO2: 98% (09-29-17 @ 05:28) (94% - 99%)  Wt(kg): --    09-28 @ 07:01  -  09-29 @ 07:00  --------------------------------------------------------  IN:    IV PiggyBack: 200 mL    Oral Fluid: 1760 mL  Total IN: 1960 mL    OUT:    Voided: 850 mL  Total OUT: 850 mL    Total NET: 1110 mL        CBC Full  -  ( 29 Sep 2017 06:41 )  WBC Count : 8.1 K/uL  Hemoglobin : 11.8 g/dL  Hematocrit : 35.0 %  Platelet Count - Automated : 113 K/uL  Mean Cell Volume : 94.6 fl  Mean Cell Hemoglobin : 31.7 pg  Mean Cell Hemoglobin Concentration : 33.6 gm/dL          PHYSICAL EXAM:  General: laying comfortably in bed, NAD  Abdominal: belly soft, mildly tender to palpation, no rebound  Extremities: No edema, + peripheral pulses  Skin: midline incision packed with Kerlix and covered with gauze, port sites C/D/I

## 2017-09-30 ENCOUNTER — TRANSCRIPTION ENCOUNTER (OUTPATIENT)
Age: 22
End: 2017-09-30

## 2017-09-30 LAB
-  AMIKACIN: SIGNIFICANT CHANGE UP
-  AMPICILLIN/SULBACTAM: SIGNIFICANT CHANGE UP
-  AMPICILLIN: SIGNIFICANT CHANGE UP
-  AZTREONAM: SIGNIFICANT CHANGE UP
-  CEFAZOLIN: SIGNIFICANT CHANGE UP
-  CEFEPIME: SIGNIFICANT CHANGE UP
-  CEFOXITIN: SIGNIFICANT CHANGE UP
-  CEFTAZIDIME: SIGNIFICANT CHANGE UP
-  CEFTRIAXONE: SIGNIFICANT CHANGE UP
-  CIPROFLOXACIN: SIGNIFICANT CHANGE UP
-  ERTAPENEM: SIGNIFICANT CHANGE UP
-  GENTAMICIN: SIGNIFICANT CHANGE UP
-  IMIPENEM: SIGNIFICANT CHANGE UP
-  LEVOFLOXACIN: SIGNIFICANT CHANGE UP
-  MEROPENEM: SIGNIFICANT CHANGE UP
-  PIPERACILLIN/TAZOBACTAM: SIGNIFICANT CHANGE UP
-  TOBRAMYCIN: SIGNIFICANT CHANGE UP
-  TRIMETHOPRIM/SULFAMETHOXAZOLE: SIGNIFICANT CHANGE UP
CULTURE RESULTS: SIGNIFICANT CHANGE UP
HCT VFR BLD CALC: 37.2 % — LOW (ref 39–50)
HGB BLD-MCNC: 12.2 G/DL — LOW (ref 13–17)
MCHC RBC-ENTMCNC: 31.2 PG — SIGNIFICANT CHANGE UP (ref 27–34)
MCHC RBC-ENTMCNC: 32.7 GM/DL — SIGNIFICANT CHANGE UP (ref 32–36)
MCV RBC AUTO: 95.4 FL — SIGNIFICANT CHANGE UP (ref 80–100)
METHOD TYPE: SIGNIFICANT CHANGE UP
ORGANISM # SPEC MICROSCOPIC CNT: SIGNIFICANT CHANGE UP
PLATELET # BLD AUTO: 136 K/UL — LOW (ref 150–400)
RBC # BLD: 3.9 M/UL — LOW (ref 4.2–5.8)
RBC # FLD: 11.9 % — SIGNIFICANT CHANGE UP (ref 10.3–14.5)
SPECIMEN SOURCE: SIGNIFICANT CHANGE UP
WBC # BLD: 8 K/UL — SIGNIFICANT CHANGE UP (ref 3.8–10.5)
WBC # FLD AUTO: 8 K/UL — SIGNIFICANT CHANGE UP (ref 3.8–10.5)

## 2017-09-30 PROCEDURE — 74177 CT ABD & PELVIS W/CONTRAST: CPT | Mod: 26

## 2017-09-30 PROCEDURE — 99223 1ST HOSP IP/OBS HIGH 75: CPT

## 2017-09-30 RX ORDER — MEROPENEM 1 G/30ML
1000 INJECTION INTRAVENOUS ONCE
Qty: 0 | Refills: 0 | Status: COMPLETED | OUTPATIENT
Start: 2017-09-30 | End: 2017-09-30

## 2017-09-30 RX ORDER — MEROPENEM 1 G/30ML
INJECTION INTRAVENOUS
Qty: 0 | Refills: 0 | Status: DISCONTINUED | OUTPATIENT
Start: 2017-09-30 | End: 2017-10-02

## 2017-09-30 RX ORDER — MEROPENEM 1 G/30ML
1000 INJECTION INTRAVENOUS EVERY 8 HOURS
Qty: 0 | Refills: 0 | Status: DISCONTINUED | OUTPATIENT
Start: 2017-09-30 | End: 2017-10-02

## 2017-09-30 RX ADMIN — OXYCODONE AND ACETAMINOPHEN 1 TABLET(S): 5; 325 TABLET ORAL at 17:40

## 2017-09-30 RX ADMIN — Medication 100 MILLIGRAM(S): at 05:05

## 2017-09-30 RX ADMIN — PIPERACILLIN AND TAZOBACTAM 25 GRAM(S): 4; .5 INJECTION, POWDER, LYOPHILIZED, FOR SOLUTION INTRAVENOUS at 05:05

## 2017-09-30 RX ADMIN — OXYCODONE AND ACETAMINOPHEN 1 TABLET(S): 5; 325 TABLET ORAL at 17:15

## 2017-09-30 RX ADMIN — OXYCODONE AND ACETAMINOPHEN 2 TABLET(S): 5; 325 TABLET ORAL at 22:18

## 2017-09-30 RX ADMIN — Medication 100 MILLIGRAM(S): at 21:49

## 2017-09-30 RX ADMIN — SENNA PLUS 2 TABLET(S): 8.6 TABLET ORAL at 21:49

## 2017-09-30 RX ADMIN — OXYCODONE AND ACETAMINOPHEN 2 TABLET(S): 5; 325 TABLET ORAL at 21:48

## 2017-09-30 RX ADMIN — MEROPENEM 200 MILLIGRAM(S): 1 INJECTION INTRAVENOUS at 21:49

## 2017-09-30 RX ADMIN — MEROPENEM 200 MILLIGRAM(S): 1 INJECTION INTRAVENOUS at 13:07

## 2017-09-30 RX ADMIN — ENOXAPARIN SODIUM 40 MILLIGRAM(S): 100 INJECTION SUBCUTANEOUS at 21:49

## 2017-09-30 NOTE — PROGRESS NOTE ADULT - SUBJECTIVE AND OBJECTIVE BOX
ATP Progress Note    S: No acute events overnight. Tolerating diet. +flatus/+bowel movement    O:  T(C): 37.3 (09-30-17 @ 05:37), Max: 37.8 (09-29-17 @ 20:00)  HR: 72 (09-30-17 @ 05:37) (69 - 91)  BP: 125/78 (09-30-17 @ 05:37) (109/68 - 129/82)  RR: 18 (09-30-17 @ 05:37) (16 - 18)  SpO2: 98% (09-30-17 @ 05:37) (95% - 99%)  Wt(kg): --    09-29 @ 07:01  -  09-30 @ 07:00  --------------------------------------------------------  IN:    Oral Fluid: 1020 mL  Total IN: 1020 mL    OUT:    Voided: 1800 mL  Total OUT: 1800 mL    Total NET: -780 mL        CBC Full  -  ( 30 Sep 2017 06:56 )  WBC Count : 8.0 K/uL  Hemoglobin : 12.2 g/dL  Hematocrit : 37.2 %  Platelet Count - Automated : 136 K/uL  Mean Cell Volume : 95.4 fl  Mean Cell Hemoglobin : 31.2 pg  Mean Cell Hemoglobin Concentration : 32.7 gm/dL      CAPILLARY BLOOD GLUCOSE      PHYSICAL EXAM:  General: laying comfortably in bed, NAD  Abdominal: belly soft, NT, ND  Extremities: No edema, + peripheral pulses  Skin: midline incision packed with Kerlix and covered with gauze, port sites C/D/I

## 2017-09-30 NOTE — CONSULT NOTE ADULT - SUBJECTIVE AND OBJECTIVE BOX
Patient is a 22y old  Male who presents with a chief complaint of Acute Appendicitis (28 Sep 2017 11:34)    HPI:  22 year old male with no past medical history presents to the emergency complaining of abdominal pain.  Pain started two days ago in the epigastric region, acutely worsening last night and localizing to the right lower quadrant.  Pain is described as sharp and stabbing in nature without any aggravating or relieving factors.  Patient reports nausea and non bloody/non billious emesis starting last night.  No changes in bowel habits.  Fevers started last night, patient is currently 103.1 in the ED. (27 Sep 2017 09:50)    No sick contacts. No recent travel. No recent antibiotics.     REVIEW OF SYSTEMS  All as below unless noted otherwise    General:  Denies fever and chills. 	  Ophthalmologic: Denies any visual complaints. 	  ENMT: No throat pain.  Respiratory: No cough, sputum. No shortness of breath.   Cardiovascular: No chest pain.   Gastrointestinal: No nausea or vomiting. No abdominal pain or diarrhea.   Genitourinary: No burning urine, no frequency. No flank pain  Musculoskeletal: No joint swelling or pain.   Neurological: No confusion. 	  Skin: No rash    PAST MEDICAL & SURGICAL HISTORY:  No pertinent past medical history  No significant past surgical history    FAMILY HISTORY:  No pertinent family history in first degree relatives    SOCIAL HISTORY:  non smoker      ANTIMICROBIALS:    meropenem IVPB    meropenem IVPB 1000 once  meropenem IVPB 1000 every 8 hours      OTHER MEDS:    enoxaparin Injectable 40 milliGRAM(s) SubCutaneous every 24 hours  oxyCODONE    5 mG/acetaminophen 325 mG 1 Tablet(s) Oral every 4 hours PRN  oxyCODONE    5 mG/acetaminophen 325 mG 2 Tablet(s) Oral every 6 hours PRN  senna 2 Tablet(s) Oral at bedtime  docusate sodium 100 milliGRAM(s) Oral three times a day      Allergies    No Known Allergies    Intolerances        Vital Signs Last 24 Hrs  T(C): 37.6 (30 Sep 2017 10:19), Max: 37.8 (29 Sep 2017 20:00)  T(F): 99.6 (30 Sep 2017 10:19), Max: 100.1 (29 Sep 2017 20:00)  HR: 82 (30 Sep 2017 10:19) (69 - 88)  BP: 120/74 (30 Sep 2017 10:19) (109/68 - 129/82)  BP(mean): --  RR: 18 (30 Sep 2017 10:19) (18 - 18)  SpO2: 95% (30 Sep 2017 10:19) (95% - 99%)  CAPILLARY BLOOD GLUCOSE        Daily     Daily         PHYSICAL EXAM:  patient in no acute respiratory distress.  Constitutional: Comfortable. Awake and alert  Eyes: PERRL EOMI. No discharge.  ENMT: No sinus tenderness.  No pharyngeal erythema.   Neck: Supple  Respiratory: Good air entry bilaterally, no wheezes, rhonchi, or crackles  Cardiovascular:S1 S2 wnl, No murmurs  Gastrointestinal: Soft, no tenderness , bowel sounds present,   Genitourinary: No suprapubic tenderness. no CVA tenderness   Musculoskeletal: No joint swelling. No edema.  Vascular: peripheral pulses felt  Neurological: No grossly focal deficits  Skin: No rash   Lymph Nodes: No palpable Lymphadenopathy   Psychiatric: Affect normal                            12.2   8.0   )-----------( 136      ( 30 Sep 2017 06:56 )             37.2     WBC Count: 8.0 (09-30 @ 06:56)  WBC Count: 8.1 (09-29 @ 06:41)  WBC Count: 10.0 (09-28 @ 07:09)  WBC Count: 7.7 (09-27 @ 08:30)                          MICROBIOLOGY:    Culture - Blood (09.29.17 @ 09:59)    Specimen Source: .Blood Blood-Peripheral    Culture Results:   No growth to date.    Culture - Blood (09.28.17 @ 06:45)    Gram Stain:   Growth in anaerobic bottle: Gram Negative Rods    Specimen Source: .Blood Blood    Culture Results:   Growth in anaerobic bottle: Gram Negative Rods      Culture - Blood (09.27.17 @ 11:58)    Gram Stain:   Growth in aerobic bottle: Gram Negative Rods    -  Escherichia coli: Detec    Specimen Source: .Blood Blood    Organism: Blood Culture PCR    Culture Results:   Growth in aerobic bottle: Escherichia coli  ***Blood Panel PCR results on this specimen are available  approximately 3 hours after the Gram stain result.***  Gram stain, PCR, and/or culture results may not always  correspond due to difference in methodologies.  ************************************************************  This PCR assay was performed using [employer].  The following targets are tested for: Enterococcus,  vancomycin resistant enterococci, Listeria monocytogenes,  coagulase negative staphylococci, S. aureus,  methicillin resistant S. aureus, Streptococcus agalactiae  (Group B), S. pneumoniae, S. pyogenes (Group A),  Acinetobacter baumannii, Enterobacter cloacae, E. coli,  Klebsiella oxytoca, K. pneumoniae, Proteus sp.,  Serratia marcescens, Haemophilus influenzae,  Neisseria meningitidis, Pseudomonas aeruginosa, Candida  albicans, C. glabrata, C krusei, C parapsilosis,  C. tropicalis and the KPC resistance gene.    Organism Identification: Blood Culture PCR    Method Type: PCR        RADIOLOGY:    < from: CT Abdomen and Pelvis w/ IV Cont (09.27.17 @ 09:41) >  FINDINGS:    LOWER CHEST: Within normal limits.    LIVER: Within normal limits.  BILE DUCTS: Normal caliber.  GALLBLADDER: Within normal limits.  SPLEEN: Within normal limits.  PANCREAS: Within normal limits.  ADRENALS: Within normal limits.  KIDNEYS/URETERS: Within normal limits.    BLADDER: Within normal limits.  REPRODUCTIVE ORGANS: Within normal limits.    BOWEL: Small hiatal hernia. Enlarged appendix with distal lumen measuring   1.1 cm containing intraluminal calcifications, representing an   appendicolith. There is surrounding fat stranding, findings are   consistent with appendicitis.   PERITONEUM: No ascites.  VESSELS:  Within normal limits.  RETROPERITONEUM: No lymphadenopathy.    ABDOMINAL WALL: Small bilateral fat-containing inguinal hernias.  BONES: Within normal limits.    IMPRESSION: Acute uncomplicated appendicitis with appendicoliths.    < end of copied text > Patient is a 22y old  Male who presents with a chief complaint of Acute Appendicitis (28 Sep 2017 11:34)    HPI:  22 year old male with no past medical history presents to the emergency complaining of abdominal pain.  Pain started two days prior to admission in the epigastric region, acutely worsening on the night of 9/26 and localizing to the right lower quadrant.  Pain is described as sharp and stabbing in nature without any aggravating or relieving factors.  Patient reports nausea and non bloody/non billious emesis starting last from 9/26.  No changes in bowel habits.  Fevers started night prior to admission, up to 103.1 in the ED. (27 Sep 2017 09:50)  CT scan was consistent with acute appendicitis without rupture.  BCs from ED positive for E coli and on 9/28 for Bacteroides. He underwent laparoscopic appendectomy on 9/28.  Was on IV zosyn from 9/27 - 9/30, antibiotics changed to meropenem for suspicion of intra-abdominal abscess on 9/30    No sick contacts. No recent travel. No recent antibiotics.     REVIEW OF SYSTEMS  All as below unless noted otherwise    General:  Denies fever and chills. 	  Ophthalmologic: Denies any visual complaints. 	  ENMT: No throat pain.  Respiratory: No cough, sputum. No shortness of breath.   Cardiovascular: No chest pain.   Gastrointestinal: No nausea or vomiting. No abdominal pain or diarrhea.   Genitourinary: No burning urine, no frequency. No flank pain  Musculoskeletal: No joint swelling or pain.   Neurological: No confusion. 	  Skin: No rash    PAST MEDICAL & SURGICAL HISTORY:  No pertinent past medical history  No significant past surgical history    FAMILY HISTORY:  No pertinent family history in first degree relatives    SOCIAL HISTORY:  non smoker      ANTIMICROBIALS:    meropenem IVPB    meropenem IVPB 1000 once  meropenem IVPB 1000 every 8 hours      OTHER MEDS:    enoxaparin Injectable 40 milliGRAM(s) SubCutaneous every 24 hours  oxyCODONE    5 mG/acetaminophen 325 mG 1 Tablet(s) Oral every 4 hours PRN  oxyCODONE    5 mG/acetaminophen 325 mG 2 Tablet(s) Oral every 6 hours PRN  senna 2 Tablet(s) Oral at bedtime  docusate sodium 100 milliGRAM(s) Oral three times a day      Allergies    No Known Allergies    Intolerances        Vital Signs Last 24 Hrs  T(C): 37.6 (30 Sep 2017 10:19), Max: 37.8 (29 Sep 2017 20:00)  T(F): 99.6 (30 Sep 2017 10:19), Max: 100.1 (29 Sep 2017 20:00)  HR: 82 (30 Sep 2017 10:19) (69 - 88)  BP: 120/74 (30 Sep 2017 10:19) (109/68 - 129/82)  BP(mean): --  RR: 18 (30 Sep 2017 10:19) (18 - 18)  SpO2: 95% (30 Sep 2017 10:19) (95% - 99%)  CAPILLARY BLOOD GLUCOSE        Daily     Daily         PHYSICAL EXAM:  patient in no acute respiratory distress.  Constitutional: Comfortable. Awake and alert  Eyes: PERRL EOMI. No discharge.  ENMT: No sinus tenderness.  No pharyngeal erythema.   Neck: Supple  Respiratory: Good air entry bilaterally, no wheezes, rhonchi, or crackles  Cardiovascular:S1 S2 wnl, No murmurs  Gastrointestinal: Soft, no tenderness , bowel sounds present,   Genitourinary: No suprapubic tenderness. no CVA tenderness   Musculoskeletal: No joint swelling. No edema.  Vascular: peripheral pulses felt  Neurological: No grossly focal deficits  Skin: No rash   Lymph Nodes: No palpable Lymphadenopathy   Psychiatric: Affect normal                            12.2   8.0   )-----------( 136      ( 30 Sep 2017 06:56 )             37.2     WBC Count: 8.0 (09-30 @ 06:56)  WBC Count: 8.1 (09-29 @ 06:41)  WBC Count: 10.0 (09-28 @ 07:09)  WBC Count: 7.7 (09-27 @ 08:30)                          MICROBIOLOGY:    Culture - Blood (09.28.17 @ 06:45)    Gram Stain:   Growth in anaerobic bottle: Gram Negative Rods    Specimen Source: .Blood Blood    Culture Results:   Growth in anaerobic bottle: Bacteroides fragilis        Culture - Blood (09.28.17 @ 06:45)    Gram Stain:   Growth in anaerobic bottle: Gram Negative Rods    Specimen Source: .Blood Blood    Culture Results:   Growth in anaerobic bottle: Gram Negative Rods      Culture - Blood (09.27.17 @ 11:58)    Gram Stain:   Growth in aerobic bottle: Gram Negative Rods    -  Escherichia coli: Detec    Specimen Source: .Blood Blood    Organism: Blood Culture PCR    Culture Results:   Growth in aerobic bottle: Escherichia coli  ***Blood Panel PCR results on this specimen are available  approximately 3 hours after the Gram stain result.***  Gram stain, PCR, and/or culture results may not always  correspond due to difference in methodologies.  ************************************************************  This PCR assay was performed using [employer].  The following targets are tested for: Enterococcus,  vancomycin resistant enterococci, Listeria monocytogenes,  coagulase negative staphylococci, S. aureus,  methicillin resistant S. aureus, Streptococcus agalactiae  (Group B), S. pneumoniae, S. pyogenes (Group A),  Acinetobacter baumannii, Enterobacter cloacae, E. coli,  Klebsiella oxytoca, K. pneumoniae, Proteus sp.,  Serratia marcescens, Haemophilus influenzae,  Neisseria meningitidis, Pseudomonas aeruginosa, Candida  albicans, C. glabrata, C krusei, C parapsilosis,  C. tropicalis and the KPC resistance gene.    Organism Identification: Blood Culture PCR    Method Type: PCR        RADIOLOGY:    < from: CT Abdomen and Pelvis w/ IV Cont (09.27.17 @ 09:41) >  FINDINGS:    LOWER CHEST: Within normal limits.    LIVER: Within normal limits.  BILE DUCTS: Normal caliber.  GALLBLADDER: Within normal limits.  SPLEEN: Within normal limits.  PANCREAS: Within normal limits.  ADRENALS: Within normal limits.  KIDNEYS/URETERS: Within normal limits.    BLADDER: Within normal limits.  REPRODUCTIVE ORGANS: Within normal limits.    BOWEL: Small hiatal hernia. Enlarged appendix with distal lumen measuring   1.1 cm containing intraluminal calcifications, representing an   appendicolith. There is surrounding fat stranding, findings are   consistent with appendicitis.   PERITONEUM: No ascites.  VESSELS:  Within normal limits.  RETROPERITONEUM: No lymphadenopathy.    ABDOMINAL WALL: Small bilateral fat-containing inguinal hernias.  BONES: Within normal limits.    IMPRESSION: Acute uncomplicated appendicitis with appendicoliths.    < end of copied text > Patient is a 22y old  Male who presents with a chief complaint of Acute Appendicitis (28 Sep 2017 11:34)    HPI:  22 year old male with no past medical history presents to the emergency complaining of abdominal pain.  Pain started two days prior to admission in the epigastric region, acutely worsening on the night of 9/26 and localizing to the right lower quadrant.  Pain is described as sharp and stabbing in nature without any aggravating or relieving factors.  Patient reports nausea and non bloody/non billious emesis starting last from 9/26.  No changes in bowel habits.  Fevers started night prior to admission, up to 103.1 in the ED. (27 Sep 2017 09:50). CT scan was consistent with acute appendicitis without rupture.  BCs from ED positive for E coli and on 9/28 for Bacteroides. He underwent laparoscopic appendectomy on 9/27, perforation was seen at base of appendix. Mild purulence in peritoneal cavity. Appendectomy performed and abdomen washed out. Umbilical skin wound left open and packed.  Was on IV zosyn from 9/27 - 9/30, antibiotics changed to meropenem for suspicion of intra-abdominal abscess on 9/30, CT ordered.  Tolerating advancing diet.    No sick contacts. No recent travel. No recent antibiotics.     REVIEW OF SYSTEMS  All as below unless noted otherwise    General:  Denies fever and chills. 	  Ophthalmologic: Denies any visual complaints. 	  ENMT: No throat pain.  Respiratory: No cough, sputum. No shortness of breath.   Cardiovascular: No chest pain.   Gastrointestinal: No nausea or vomiting. No abdominal pain or diarrhea.   Genitourinary: No burning urine, no frequency. No flank pain  Musculoskeletal: No joint swelling or pain.   Neurological: No confusion. 	  Skin: No rash    PAST MEDICAL & SURGICAL HISTORY:  No pertinent past medical history  No significant past surgical history    FAMILY HISTORY:  No pertinent family history in first degree relatives    SOCIAL HISTORY:  non smoker      ANTIMICROBIALS:    meropenem IVPB    meropenem IVPB 1000 once  meropenem IVPB 1000 every 8 hours      OTHER MEDS:    enoxaparin Injectable 40 milliGRAM(s) SubCutaneous every 24 hours  oxyCODONE    5 mG/acetaminophen 325 mG 1 Tablet(s) Oral every 4 hours PRN  oxyCODONE    5 mG/acetaminophen 325 mG 2 Tablet(s) Oral every 6 hours PRN  senna 2 Tablet(s) Oral at bedtime  docusate sodium 100 milliGRAM(s) Oral three times a day      Allergies    No Known Allergies    Intolerances        Vital Signs Last 24 Hrs  T(C): 37.6 (30 Sep 2017 10:19), Max: 37.8 (29 Sep 2017 20:00)  T(F): 99.6 (30 Sep 2017 10:19), Max: 100.1 (29 Sep 2017 20:00)  HR: 82 (30 Sep 2017 10:19) (69 - 88)  BP: 120/74 (30 Sep 2017 10:19) (109/68 - 129/82)  BP(mean): --  RR: 18 (30 Sep 2017 10:19) (18 - 18)  SpO2: 95% (30 Sep 2017 10:19) (95% - 99%)  CAPILLARY BLOOD GLUCOSE        Daily     Daily         PHYSICAL EXAM:  patient in no acute respiratory distress.  Constitutional: Comfortable. Awake and alert  Eyes: PERRL EOMI. No discharge.  ENMT: No sinus tenderness.  No pharyngeal erythema.   Neck: Supple  Respiratory: Good air entry bilaterally, no wheezes, rhonchi, or crackles  Cardiovascular:S1 S2 wnl, No murmurs  Gastrointestinal: Soft, no tenderness , bowel sounds present,   Genitourinary: No suprapubic tenderness. no CVA tenderness   Musculoskeletal: No joint swelling. No edema.  Vascular: peripheral pulses felt  Neurological: No grossly focal deficits  Skin: No rash   Lymph Nodes: No palpable Lymphadenopathy   Psychiatric: Affect normal                            12.2   8.0   )-----------( 136      ( 30 Sep 2017 06:56 )             37.2     WBC Count: 8.0 (09-30 @ 06:56)  WBC Count: 8.1 (09-29 @ 06:41)  WBC Count: 10.0 (09-28 @ 07:09)  WBC Count: 7.7 (09-27 @ 08:30)                          MICROBIOLOGY:    Culture - Blood (09.28.17 @ 06:45)    Gram Stain:   Growth in anaerobic bottle: Gram Negative Rods    Specimen Source: .Blood Blood    Culture Results:   Growth in anaerobic bottle: Bacteroides fragilis        Culture - Blood (09.28.17 @ 06:45)    Gram Stain:   Growth in anaerobic bottle: Gram Negative Rods    Specimen Source: .Blood Blood    Culture Results:   Growth in anaerobic bottle: Gram Negative Rods      Culture - Blood (09.27.17 @ 11:58)    Gram Stain:   Growth in aerobic bottle: Gram Negative Rods    -  Escherichia coli: Detec    Specimen Source: .Blood Blood    Organism: Blood Culture PCR    Culture Results:   Growth in aerobic bottle: Escherichia coli  ***Blood Panel PCR results on this specimen are available  approximately 3 hours after the Gram stain result.***  Gram stain, PCR, and/or culture results may not always  correspond due to difference in methodologies.  ************************************************************  This PCR assay was performed using [employer].  The following targets are tested for: Enterococcus,  vancomycin resistant enterococci, Listeria monocytogenes,  coagulase negative staphylococci, S. aureus,  methicillin resistant S. aureus, Streptococcus agalactiae  (Group B), S. pneumoniae, S. pyogenes (Group A),  Acinetobacter baumannii, Enterobacter cloacae, E. coli,  Klebsiella oxytoca, K. pneumoniae, Proteus sp.,  Serratia marcescens, Haemophilus influenzae,  Neisseria meningitidis, Pseudomonas aeruginosa, Candida  albicans, C. glabrata, C krusei, C parapsilosis,  C. tropicalis and the KPC resistance gene.    Organism Identification: Blood Culture PCR    Method Type: PCR        RADIOLOGY:    < from: CT Abdomen and Pelvis w/ IV Cont (09.27.17 @ 09:41) >  FINDINGS:    LOWER CHEST: Within normal limits.    LIVER: Within normal limits.  BILE DUCTS: Normal caliber.  GALLBLADDER: Within normal limits.  SPLEEN: Within normal limits.  PANCREAS: Within normal limits.  ADRENALS: Within normal limits.  KIDNEYS/URETERS: Within normal limits.    BLADDER: Within normal limits.  REPRODUCTIVE ORGANS: Within normal limits.    BOWEL: Small hiatal hernia. Enlarged appendix with distal lumen measuring   1.1 cm containing intraluminal calcifications, representing an   appendicolith. There is surrounding fat stranding, findings are   consistent with appendicitis.   PERITONEUM: No ascites.  VESSELS:  Within normal limits.  RETROPERITONEUM: No lymphadenopathy.    ABDOMINAL WALL: Small bilateral fat-containing inguinal hernias.  BONES: Within normal limits.    IMPRESSION: Acute uncomplicated appendicitis with appendicoliths.    < end of copied text > Patient is a 22y old  Male who presents with a chief complaint of Acute Appendicitis (28 Sep 2017 11:34)    HPI:  22 year old male with no past medical history presents to the emergency complaining of abdominal pain.  Pain started two days prior to admission in the epigastric region, acutely worsening on the night of 9/26 and localizing to the right lower quadrant.  Pain is described as sharp and stabbing in nature without any aggravating or relieving factors.  Patient reports nausea and non bloody/non billious emesis starting last from 9/26.  No changes in bowel habits.  Fevers started night prior to admission, up to 103.1 in the ED. (27 Sep 2017 09:50). CT scan was consistent with acute appendicitis without rupture.  BCs from ED positive for E coli and on 9/28 for Bacteroides. He underwent laparoscopic appendectomy on 9/27, perforation was seen at base of appendix. Mild purulence in peritoneal cavity. Appendectomy performed and abdomen washed out. Umbilical skin wound left open and packed.  Was on IV zosyn from 9/27 - 9/30, antibiotics changed to meropenem for suspicion of intra-abdominal abscess on 9/30, CT ordered.  Tolerating advancing diet. Had CT scan done just now with 2 small intra-abdominal fluid collections near surgical bed.    No sick contacts. No recent travel. No recent antibiotics.     REVIEW OF SYSTEMS  All as below unless noted otherwise    General:  Denies fever and chills. 	  Ophthalmologic: Denies any visual complaints. 	  ENMT: No throat pain.  Respiratory: No cough, sputum. No shortness of breath.   Cardiovascular: No chest pain.   Gastrointestinal: No nausea or vomiting. No abdominal pain or diarrhea.   Genitourinary: No burning urine, no frequency. No flank pain  Musculoskeletal: No joint swelling or pain.   Neurological: No confusion. 	  Skin: No rash    PAST MEDICAL & SURGICAL HISTORY:  No pertinent past medical history  No significant past surgical history    FAMILY HISTORY:  No pertinent family history in first degree relatives    SOCIAL HISTORY:  non smoker      ANTIMICROBIALS:    meropenem IVPB    meropenem IVPB 1000 once  meropenem IVPB 1000 every 8 hours      OTHER MEDS:    enoxaparin Injectable 40 milliGRAM(s) SubCutaneous every 24 hours  oxyCODONE    5 mG/acetaminophen 325 mG 1 Tablet(s) Oral every 4 hours PRN  oxyCODONE    5 mG/acetaminophen 325 mG 2 Tablet(s) Oral every 6 hours PRN  senna 2 Tablet(s) Oral at bedtime  docusate sodium 100 milliGRAM(s) Oral three times a day      Allergies    No Known Allergies    Intolerances        Vital Signs Last 24 Hrs  T(C): 37.6 (30 Sep 2017 10:19), Max: 37.8 (29 Sep 2017 20:00)  T(F): 99.6 (30 Sep 2017 10:19), Max: 100.1 (29 Sep 2017 20:00)  HR: 82 (30 Sep 2017 10:19) (69 - 88)  BP: 120/74 (30 Sep 2017 10:19) (109/68 - 129/82)  BP(mean): --  RR: 18 (30 Sep 2017 10:19) (18 - 18)  SpO2: 95% (30 Sep 2017 10:19) (95% - 99%)  CAPILLARY BLOOD GLUCOSE        Daily     Daily         PHYSICAL EXAM:  patient in no acute respiratory distress.  Constitutional: Comfortable. Awake and alert  Eyes: PERRL EOMI. No discharge.  ENMT: No sinus tenderness.  No pharyngeal erythema.   Neck: Supple  Respiratory: Good air entry bilaterally, no wheezes, rhonchi, or crackles  Cardiovascular:S1 S2 wnl, No murmurs  Gastrointestinal: Soft, no tenderness , bowel sounds present,   Genitourinary: No suprapubic tenderness. no CVA tenderness   Musculoskeletal: No joint swelling. No edema.  Vascular: peripheral pulses felt  Neurological: No grossly focal deficits  Skin: No rash   Lymph Nodes: No palpable Lymphadenopathy   Psychiatric: Affect normal                            12.2   8.0   )-----------( 136      ( 30 Sep 2017 06:56 )             37.2     WBC Count: 8.0 (09-30 @ 06:56)  WBC Count: 8.1 (09-29 @ 06:41)  WBC Count: 10.0 (09-28 @ 07:09)  WBC Count: 7.7 (09-27 @ 08:30)                          MICROBIOLOGY:    Culture - Blood (09.28.17 @ 06:45)    Gram Stain:   Growth in anaerobic bottle: Gram Negative Rods    Specimen Source: .Blood Blood    Culture Results:   Growth in anaerobic bottle: Bacteroides fragilis        Culture - Blood (09.28.17 @ 06:45)    Gram Stain:   Growth in anaerobic bottle: Gram Negative Rods    Specimen Source: .Blood Blood    Culture Results:   Growth in anaerobic bottle: Gram Negative Rods      Culture - Blood (09.27.17 @ 11:58)    Gram Stain:   Growth in aerobic bottle: Gram Negative Rods    -  Escherichia coli: Detec    Specimen Source: .Blood Blood    Organism: Blood Culture PCR    Culture Results:   Growth in aerobic bottle: Escherichia coli  ***Blood Panel PCR results on this specimen are available  approximately 3 hours after the Gram stain result.***  Gram stain, PCR, and/or culture results may not always  correspond due to difference in methodologies.  ************************************************************  This PCR assay was performed using [employer].  The following targets are tested for: Enterococcus,  vancomycin resistant enterococci, Listeria monocytogenes,  coagulase negative staphylococci, S. aureus,  methicillin resistant S. aureus, Streptococcus agalactiae  (Group B), S. pneumoniae, S. pyogenes (Group A),  Acinetobacter baumannii, Enterobacter cloacae, E. coli,  Klebsiella oxytoca, K. pneumoniae, Proteus sp.,  Serratia marcescens, Haemophilus influenzae,  Neisseria meningitidis, Pseudomonas aeruginosa, Candida  albicans, C. glabrata, C krusei, C parapsilosis,  C. tropicalis and the KPC resistance gene.    Organism Identification: Blood Culture PCR    Method Type: PCR        RADIOLOGY:  < from: CT Abdomen and Pelvis w/ IV Cont (09.30.17 @ 13:48) >  EXAM:  CT ABDOMEN AND PELVIS IC                            PROCEDURE DATE:  09/30/2017            INTERPRETATION:  CLINICAL INFORMATION: Perforated appendicitis status   post laparoscopic appendectomy, Escherichia coli bacteremia.    COMPARISON: CTabdomen pelvis 9/27/2017    PROCEDURE:   CT of the Abdomen and Pelvis was performed with intravenous contrast.   Intravenous contrast: 90 ml Omnipaque 350. 10 ml discarded.  Oral contrast: positive contrast was administered.  Sagittal and coronal reformats were performed.    FINDINGS:    LOWER CHEST: Mild bibasilar linear type atelectasis.    LIVER: Within normal limits.  BILE DUCTS: Normal caliber.  GALLBLADDER: Within normal limits.  SPLEEN: Within normal limits.  PANCREAS: Within normal limits.  ADRENALS: Within normal limits.  KIDNEYS/URETERS: Within normal limits.    BLADDER: Within normal limits.  REPRODUCTIVE ORGANS: The prostate is within normal limits.     BOWEL: Status post appendectomy. No bowel obstruction.Two small   ill-defined fluid collections in the right lower quadrant are present   with the largest (2:93) measuring 4.0 x 2.8 x 3.0 cm. Mild focal   thickening of the sigmoid colon in the right lower quadrant is likely   reactive to recent inflammation.  PERITONEUM: No ascites.  VESSELS:  Within normal limits.  RETROPERITONEUM: No lymphadenopathy.    ABDOMINAL WALL: Midline postoperative changes with a small open wound.   Bilateral fat-containing inguinal hernias.  BONES: Within normal limits.    IMPRESSION:     Statuspost appendectomy.     Two small ill-defined fluid collections in the right lower quadrant,   largest 4.0 x 2.8 x 3.0 cm. Patient is a 22y old  Male who presents with a chief complaint of Acute Appendicitis (28 Sep 2017 11:34)    HPI:  22 year old male with no past medical history presents to the emergency complaining of abdominal pain.  Pain started two days prior to admission in the epigastric region, acutely worsening on the night of 9/26 and localizing to the right lower quadrant.  Pain is described as sharp and stabbing in nature without any aggravating or relieving factors.  Patient reports nausea and non bloody/non billious emesis starting last from 9/26.  No changes in bowel habits.  Fevers started night prior to admission, up to 103.1 in the ED. (27 Sep 2017 09:50). CT scan was consistent with acute appendicitis without rupture.  BCs from ED positive for E coli and on 9/28 for Bacteroides. He underwent laparoscopic appendectomy on 9/27, perforation was seen at base of appendix. Mild purulence in peritoneal cavity. Appendectomy performed and abdomen washed out. Umbilical skin wound left open and packed.  Was on IV zosyn from 9/27 - 9/30, antibiotics changed to meropenem for suspicion of intra-abdominal abscess on 9/30, CT ordered.  Tolerating advancing diet. Had CT scan done just now with 2 small intra-abdominal fluid collections near surgical bed.    No sick contacts. No recent travel. No recent antibiotics.     REVIEW OF SYSTEMS  All as below unless noted otherwise  Feeling better day to day.  General:  Denies fever and chills. 	  Ophthalmologic: Denies any visual complaints. 	  ENMT: No throat pain.  Respiratory: No cough, sputum. No shortness of breath.   Cardiovascular: No chest pain.   Gastrointestinal: No nausea or vomiting, or diarrhea. Has occasional pain at lower abdominal wound with movement (getting off stretcher)  Genitourinary: No burning urine, no frequency. No flank pain  Musculoskeletal: No joint swelling or pain.   Neurological: No confusion. 	  Skin: No rash    PAST MEDICAL & SURGICAL HISTORY:  No pertinent past medical history  No significant past surgical history    FAMILY HISTORY:  No pertinent family history in first degree relatives    SOCIAL HISTORY:  non smoker      ANTIMICROBIALS:    meropenem IVPB    meropenem IVPB 1000 once  meropenem IVPB 1000 every 8 hours      OTHER MEDS:    enoxaparin Injectable 40 milliGRAM(s) SubCutaneous every 24 hours  oxyCODONE    5 mG/acetaminophen 325 mG 1 Tablet(s) Oral every 4 hours PRN  oxyCODONE    5 mG/acetaminophen 325 mG 2 Tablet(s) Oral every 6 hours PRN  senna 2 Tablet(s) Oral at bedtime  docusate sodium 100 milliGRAM(s) Oral three times a day      Allergies    No Known Allergies    Intolerances        Vital Signs Last 24 Hrs  T(C): 37.6 (30 Sep 2017 10:19), Max: 37.8 (29 Sep 2017 20:00)  T(F): 99.6 (30 Sep 2017 10:19), Max: 100.1 (29 Sep 2017 20:00)  HR: 82 (30 Sep 2017 10:19) (69 - 88)  BP: 120/74 (30 Sep 2017 10:19) (109/68 - 129/82)  BP(mean): --  RR: 18 (30 Sep 2017 10:19) (18 - 18)  SpO2: 95% (30 Sep 2017 10:19) (95% - 99%)  CAPILLARY BLOOD GLUCOSE        Daily     Daily         PHYSICAL EXAM:  patient in no acute respiratory distress.  Constitutional: Comfortable. Awake and alert  Eyes: PERRL EOMI. No discharge.  ENMT: No sinus tenderness.  No pharyngeal erythema.   Neck: Supple  Respiratory: Good air entry bilaterally, no wheezes, rhonchi, or crackles  Cardiovascular:S1 S2 wnl, No murmurs  Gastrointestinal: Soft , bowel sounds present, laparotomy wound with packing healing, lower wound with some erythema.  Genitourinary: No suprapubic tenderness. no CVA tenderness   Musculoskeletal: No joint swelling. No edema.  Vascular: peripheral pulses felt  Neurological: No grossly focal deficits  Skin: No rash   Lymph Nodes: No palpable Lymphadenopathy   Psychiatric: Affect normal                            12.2   8.0   )-----------( 136      ( 30 Sep 2017 06:56 )             37.2     WBC Count: 8.0 (09-30 @ 06:56)  WBC Count: 8.1 (09-29 @ 06:41)  WBC Count: 10.0 (09-28 @ 07:09)  WBC Count: 7.7 (09-27 @ 08:30)                          MICROBIOLOGY:    Culture - Blood (09.28.17 @ 06:45)    Gram Stain:   Growth in anaerobic bottle: Gram Negative Rods    Specimen Source: .Blood Blood    Culture Results:   Growth in anaerobic bottle: Bacteroides fragilis        Culture - Blood (09.28.17 @ 06:45)    Gram Stain:   Growth in anaerobic bottle: Gram Negative Rods    Specimen Source: .Blood Blood    Culture Results:   Growth in anaerobic bottle: Gram Negative Rods      Culture - Blood (09.27.17 @ 11:58)    Gram Stain:   Growth in aerobic bottle: Gram Negative Rods    -  Escherichia coli: Detec    Specimen Source: .Blood Blood    Organism: Blood Culture PCR    Culture Results:   Growth in aerobic bottle: Escherichia coli  ***Blood Panel PCR results on this specimen are available  approximately 3 hours after the Gram stain result.***  Gram stain, PCR, and/or culture results may not always  correspond due to difference in methodologies.  ************************************************************  This PCR assay was performed using [employer].  The following targets are tested for: Enterococcus,  vancomycin resistant enterococci, Listeria monocytogenes,  coagulase negative staphylococci, S. aureus,  methicillin resistant S. aureus, Streptococcus agalactiae  (Group B), S. pneumoniae, S. pyogenes (Group A),  Acinetobacter baumannii, Enterobacter cloacae, E. coli,  Klebsiella oxytoca, K. pneumoniae, Proteus sp.,  Serratia marcescens, Haemophilus influenzae,  Neisseria meningitidis, Pseudomonas aeruginosa, Candida  albicans, C. glabrata, C krusei, C parapsilosis,  C. tropicalis and the KPC resistance gene.    Organism Identification: Blood Culture PCR    Method Type: PCR        RADIOLOGY:  < from: CT Abdomen and Pelvis w/ IV Cont (09.30.17 @ 13:48) >  EXAM:  CT ABDOMEN AND PELVIS IC                            PROCEDURE DATE:  09/30/2017            INTERPRETATION:  CLINICAL INFORMATION: Perforated appendicitis status   post laparoscopic appendectomy, Escherichia coli bacteremia.    COMPARISON: CTabdomen pelvis 9/27/2017    PROCEDURE:   CT of the Abdomen and Pelvis was performed with intravenous contrast.   Intravenous contrast: 90 ml Omnipaque 350. 10 ml discarded.  Oral contrast: positive contrast was administered.  Sagittal and coronal reformats were performed.    FINDINGS:    LOWER CHEST: Mild bibasilar linear type atelectasis.    LIVER: Within normal limits.  BILE DUCTS: Normal caliber.  GALLBLADDER: Within normal limits.  SPLEEN: Within normal limits.  PANCREAS: Within normal limits.  ADRENALS: Within normal limits.  KIDNEYS/URETERS: Within normal limits.    BLADDER: Within normal limits.  REPRODUCTIVE ORGANS: The prostate is within normal limits.     BOWEL: Status post appendectomy. No bowel obstruction.Two small   ill-defined fluid collections in the right lower quadrant are present   with the largest (2:93) measuring 4.0 x 2.8 x 3.0 cm. Mild focal   thickening of the sigmoid colon in the right lower quadrant is likely   reactive to recent inflammation.  PERITONEUM: No ascites.  VESSELS:  Within normal limits.  RETROPERITONEUM: No lymphadenopathy.    ABDOMINAL WALL: Midline postoperative changes with a small open wound.   Bilateral fat-containing inguinal hernias.  BONES: Within normal limits.    IMPRESSION:     Statuspost appendectomy.     Two small ill-defined fluid collections in the right lower quadrant,   largest 4.0 x 2.8 x 3.0 cm.

## 2017-09-30 NOTE — PROGRESS NOTE ADULT - ASSESSMENT
A/P: 22y Male s/p lap. appendectomy. Pain control adequate. Tolerating regular diet. Voiding appropriately. E. coli found on initial blood cultures. Repeat cultures pending.    - Cont Zosyn  - Diet: maintain regular diet  - Activity: OOB as tolerated  - Pain control  - DVT ppx    Rick Black M.D.

## 2017-09-30 NOTE — CONSULT NOTE ADULT - ASSESSMENT
22 year old male with no significant PMH presented with acute onset of abdominal pain progressive to localizing in the RLQ, fever, polymicrobial bacteremia, CT C/W appendicitis, now S/P lap appendectomy. Afebriel after 9/28, no elevated WBC.  On zosyn 9/27-9/30, antibiotic changed to meropenem on 9/30. 22 year old male with no significant PMH presented with acute onset of abdominal pain progressive to localizing in the RLQ, fever, polymicrobial bacteremia, CT C/W appendicitis, now S/P lap appendectomy with perforation and intra-abdominal purulence noted. . Afebrile after 9/28, no elevated WBC. Tolerating advancing diet.  On zosyn 9/27-9/30, antibiotic changed to meropenem on 9/30. 22 year old male with no significant PMH presented with acute onset of abdominal pain progressive to localizing in the RLQ, fever, polymicrobial bacteremia, CT C/W appendicitis, now S/P lap appendectomy with perforation and intra-abdominal purulence noted. . Afebrile after 9/28, no elevated WBC. Tolerating advancing diet.  On zosyn 9/27-9/30, antibiotic changed to meropenem on 9/30.  CT today showing 2 small fluid collections, does not indicate that the organisms are resistant to zosyn, will await final susceptibilities. 22 year old male with no significant PMH presented with acute onset of abdominal pain progressive to localizing in the RLQ, fever, polymicrobial bacteremia, CT C/W appendicitis, now S/P lap appendectomy with perforation and intra-abdominal purulence noted. . Afebrile after 9/28, no elevated WBC. Tolerating advancing diet.  On zosyn 9/27-9/30, antibiotic changed to meropenem on 9/30.  CT today showing 2 small fluid collections, does not indicate that the organisms are resistant to zosyn, will await final susceptibilities.  Discuss draining collections with IR (largest up to 4 cm), send drainage for culture.    Barrett Angel MD  pager [phone]  office [phone]

## 2017-10-01 LAB
ANION GAP SERPL CALC-SCNC: 12 MMOL/L — SIGNIFICANT CHANGE UP (ref 5–17)
BUN SERPL-MCNC: 7 MG/DL — SIGNIFICANT CHANGE UP (ref 7–23)
CALCIUM SERPL-MCNC: 9.2 MG/DL — SIGNIFICANT CHANGE UP (ref 8.4–10.5)
CHLORIDE SERPL-SCNC: 103 MMOL/L — SIGNIFICANT CHANGE UP (ref 96–108)
CO2 SERPL-SCNC: 26 MMOL/L — SIGNIFICANT CHANGE UP (ref 22–31)
CREAT SERPL-MCNC: 0.79 MG/DL — SIGNIFICANT CHANGE UP (ref 0.5–1.3)
CULTURE RESULTS: SIGNIFICANT CHANGE UP
GLUCOSE SERPL-MCNC: 109 MG/DL — HIGH (ref 70–99)
MAGNESIUM SERPL-MCNC: 2.2 MG/DL — SIGNIFICANT CHANGE UP (ref 1.6–2.6)
PHOSPHATE SERPL-MCNC: 4.3 MG/DL — SIGNIFICANT CHANGE UP (ref 2.5–4.5)
POTASSIUM SERPL-MCNC: 4.2 MMOL/L — SIGNIFICANT CHANGE UP (ref 3.5–5.3)
POTASSIUM SERPL-SCNC: 4.2 MMOL/L — SIGNIFICANT CHANGE UP (ref 3.5–5.3)
SODIUM SERPL-SCNC: 141 MMOL/L — SIGNIFICANT CHANGE UP (ref 135–145)
SPECIMEN SOURCE: SIGNIFICANT CHANGE UP

## 2017-10-01 PROCEDURE — G9001: CPT

## 2017-10-01 RX ORDER — DIPHENHYDRAMINE HCL 50 MG
25 CAPSULE ORAL ONCE
Qty: 0 | Refills: 0 | Status: COMPLETED | OUTPATIENT
Start: 2017-10-01 | End: 2017-10-01

## 2017-10-01 RX ORDER — ONDANSETRON 8 MG/1
4 TABLET, FILM COATED ORAL EVERY 8 HOURS
Qty: 0 | Refills: 0 | Status: DISCONTINUED | OUTPATIENT
Start: 2017-10-01 | End: 2017-10-02

## 2017-10-01 RX ADMIN — MEROPENEM 200 MILLIGRAM(S): 1 INJECTION INTRAVENOUS at 13:57

## 2017-10-01 RX ADMIN — MEROPENEM 200 MILLIGRAM(S): 1 INJECTION INTRAVENOUS at 05:27

## 2017-10-01 RX ADMIN — Medication 100 MILLIGRAM(S): at 05:27

## 2017-10-01 RX ADMIN — Medication 100 MILLIGRAM(S): at 22:51

## 2017-10-01 RX ADMIN — MEROPENEM 200 MILLIGRAM(S): 1 INJECTION INTRAVENOUS at 22:51

## 2017-10-01 RX ADMIN — ONDANSETRON 4 MILLIGRAM(S): 8 TABLET, FILM COATED ORAL at 18:46

## 2017-10-01 RX ADMIN — Medication 25 MILLIGRAM(S): at 22:52

## 2017-10-01 RX ADMIN — Medication 100 MILLIGRAM(S): at 13:57

## 2017-10-01 RX ADMIN — ENOXAPARIN SODIUM 40 MILLIGRAM(S): 100 INJECTION SUBCUTANEOUS at 22:51

## 2017-10-01 RX ADMIN — SENNA PLUS 2 TABLET(S): 8.6 TABLET ORAL at 22:51

## 2017-10-01 NOTE — PROVIDER CONTACT NOTE (OTHER) - BACKGROUND
PT admitted on 9/27 for RLQ pain, N/V. on 9/27 pt had lap appendectomy & abdominal wash up completed.

## 2017-10-01 NOTE — PROGRESS NOTE ADULT - SUBJECTIVE AND OBJECTIVE BOX
ATP Progress Note    S: No acute events overnight. Tolerating diet. +flatus/+bowel movement. Abx changed to meropenem as per ID.    O:  T(C): 36.9 (10-01-17 @ 09:28), Max: 37.3 (09-30-17 @ 16:20)  HR: 57 (10-01-17 @ 09:28) (57 - 93)  BP: 130/78 (10-01-17 @ 09:28) (125/76 - 135/77)  RR: 18 (10-01-17 @ 09:28) (18 - 18)  SpO2: 99% (10-01-17 @ 09:28) (99% - 100%)  Wt(kg): --    09-30 @ 07:01  -  10-01 @ 07:00  --------------------------------------------------------  IN:    Oral Fluid: 1480 mL  Total IN: 1480 mL    OUT:    Voided: 700 mL  Total OUT: 700 mL    Total NET: 780 mL      10-01 @ 07:01  -  10-01 @ 11:09  --------------------------------------------------------  IN:    Oral Fluid: 320 mL  Total IN: 320 mL    OUT:  Total OUT: 0 mL    Total NET: 320 mL      PHYSICAL EXAM:  General: laying comfortably in bed, NAD  Abdominal: belly soft, NT, ND  Extremities: No edema, + peripheral pulses  Skin: midline incision packed with Kerlix and covered with gauze, port sites C/D/I    CBC Full  -  ( 30 Sep 2017 06:56 )  WBC Count : 8.0 K/uL  Hemoglobin : 12.2 g/dL  Hematocrit : 37.2 %  Platelet Count - Automated : 136 K/uL  Mean Cell Volume : 95.4 fl  Mean Cell Hemoglobin : 31.2 pg  Mean Cell Hemoglobin Concentration : 32.7 gm/dL  Auto Neutrophil # : x  Auto Lymphocyte # : x  Auto Monocyte # : x  Auto Eosinophil # : x  Auto Basophil # : x  Auto Neutrophil % : x  Auto Lymphocyte % : x  Auto Monocyte % : x  Auto Eosinophil % : x  Auto Basophil % : x    Culture - Blood (09.28.17 @ 06:45)    Gram Stain:   Growth in anaerobic bottle: Gram Negative Rods    Specimen Source: .Blood Blood    Culture Results:   Growth in anaerobic bottle: Bacteroides fragilis

## 2017-10-01 NOTE — PROVIDER CONTACT NOTE (OTHER) - ACTION/TREATMENT ORDERED:
PA Viktoriya aware, as per PA, will order one time dose of 25mg benadryl for itchiness. No further intervention necessary. Will continue to monitor.

## 2017-10-01 NOTE — PROVIDER CONTACT NOTE (OTHER) - ASSESSMENT
Pt is A&Ox4, pt is able to verbalize understanding. Wet to dry abdominal dsg remains c/d/i, no redness or swelling or malodorous drainage noted at site. PT has no complaints of pain.

## 2017-10-01 NOTE — PROGRESS NOTE ADULT - ASSESSMENT
A/P: 22y Male s/p lap. appendectomy. Pain control adequate. Tolerating regular diet. Voiding appropriately. E. coli found on initial blood cultures. Repeat cultures pending.  - Diet: maintain regular diet  - Activity: OOB as tolerated  - Pain control  - DVT ppx  -ID: c/w abx (meropenem)  - f/u cultures    Anthony Salinas DDS, MD

## 2017-10-02 VITALS
SYSTOLIC BLOOD PRESSURE: 131 MMHG | TEMPERATURE: 98 F | HEART RATE: 65 BPM | DIASTOLIC BLOOD PRESSURE: 69 MMHG | RESPIRATION RATE: 18 BRPM | OXYGEN SATURATION: 99 %

## 2017-10-02 PROCEDURE — 74177 CT ABD & PELVIS W/CONTRAST: CPT

## 2017-10-02 PROCEDURE — 82435 ASSAY OF BLOOD CHLORIDE: CPT

## 2017-10-02 PROCEDURE — 83690 ASSAY OF LIPASE: CPT

## 2017-10-02 PROCEDURE — 84132 ASSAY OF SERUM POTASSIUM: CPT

## 2017-10-02 PROCEDURE — 80053 COMPREHEN METABOLIC PANEL: CPT

## 2017-10-02 PROCEDURE — 88342 IMHCHEM/IMCYTCHM 1ST ANTB: CPT

## 2017-10-02 PROCEDURE — 85027 COMPLETE CBC AUTOMATED: CPT

## 2017-10-02 PROCEDURE — 87150 DNA/RNA AMPLIFIED PROBE: CPT

## 2017-10-02 PROCEDURE — 84100 ASSAY OF PHOSPHORUS: CPT

## 2017-10-02 PROCEDURE — 88304 TISSUE EXAM BY PATHOLOGIST: CPT

## 2017-10-02 PROCEDURE — 86850 RBC ANTIBODY SCREEN: CPT

## 2017-10-02 PROCEDURE — 82803 BLOOD GASES ANY COMBINATION: CPT

## 2017-10-02 PROCEDURE — 81003 URINALYSIS AUTO W/O SCOPE: CPT

## 2017-10-02 PROCEDURE — 82947 ASSAY GLUCOSE BLOOD QUANT: CPT

## 2017-10-02 PROCEDURE — 88360 TUMOR IMMUNOHISTOCHEM/MANUAL: CPT

## 2017-10-02 PROCEDURE — 86900 BLOOD TYPING SEROLOGIC ABO: CPT

## 2017-10-02 PROCEDURE — 85014 HEMATOCRIT: CPT

## 2017-10-02 PROCEDURE — 85730 THROMBOPLASTIN TIME PARTIAL: CPT

## 2017-10-02 PROCEDURE — 88305 TISSUE EXAM BY PATHOLOGIST: CPT

## 2017-10-02 PROCEDURE — 80048 BASIC METABOLIC PNL TOTAL CA: CPT

## 2017-10-02 PROCEDURE — 99024 POSTOP FOLLOW-UP VISIT: CPT

## 2017-10-02 PROCEDURE — 96374 THER/PROPH/DIAG INJ IV PUSH: CPT | Mod: XU

## 2017-10-02 PROCEDURE — 85610 PROTHROMBIN TIME: CPT

## 2017-10-02 PROCEDURE — C1889: CPT

## 2017-10-02 PROCEDURE — 87040 BLOOD CULTURE FOR BACTERIA: CPT

## 2017-10-02 PROCEDURE — 96375 TX/PRO/DX INJ NEW DRUG ADDON: CPT

## 2017-10-02 PROCEDURE — 82330 ASSAY OF CALCIUM: CPT

## 2017-10-02 PROCEDURE — 99232 SBSQ HOSP IP/OBS MODERATE 35: CPT

## 2017-10-02 PROCEDURE — 88341 IMHCHEM/IMCYTCHM EA ADD ANTB: CPT

## 2017-10-02 PROCEDURE — 87186 SC STD MICRODIL/AGAR DIL: CPT

## 2017-10-02 PROCEDURE — 83605 ASSAY OF LACTIC ACID: CPT

## 2017-10-02 PROCEDURE — 83735 ASSAY OF MAGNESIUM: CPT

## 2017-10-02 PROCEDURE — 84295 ASSAY OF SERUM SODIUM: CPT

## 2017-10-02 PROCEDURE — 99285 EMERGENCY DEPT VISIT HI MDM: CPT | Mod: 25

## 2017-10-02 PROCEDURE — 86901 BLOOD TYPING SEROLOGIC RH(D): CPT

## 2017-10-02 RX ORDER — METRONIDAZOLE 500 MG
1 TABLET ORAL
Qty: 42 | Refills: 0 | OUTPATIENT
Start: 2017-10-02 | End: 2017-10-16

## 2017-10-02 RX ORDER — CIPROFLOXACIN LACTATE 400MG/40ML
1 VIAL (ML) INTRAVENOUS
Qty: 14 | Refills: 0 | OUTPATIENT
Start: 2017-10-02 | End: 2017-10-16

## 2017-10-02 RX ORDER — OXYCODONE HYDROCHLORIDE 5 MG/1
1 TABLET ORAL
Qty: 20 | Refills: 0 | OUTPATIENT
Start: 2017-10-02 | End: 2017-10-09

## 2017-10-02 RX ORDER — OXYCODONE HYDROCHLORIDE 5 MG/1
1 TABLET ORAL
Qty: 30 | Refills: 0 | OUTPATIENT
Start: 2017-10-02 | End: 2017-10-09

## 2017-10-02 RX ORDER — ERTAPENEM SODIUM 1 G/1
1000 INJECTION, POWDER, LYOPHILIZED, FOR SOLUTION INTRAMUSCULAR; INTRAVENOUS EVERY 24 HOURS
Qty: 0 | Refills: 0 | Status: DISCONTINUED | OUTPATIENT
Start: 2017-10-02 | End: 2017-10-02

## 2017-10-02 RX ORDER — METRONIDAZOLE 500 MG
500 TABLET ORAL EVERY 8 HOURS
Qty: 0 | Refills: 0 | Status: DISCONTINUED | OUTPATIENT
Start: 2017-10-02 | End: 2017-10-02

## 2017-10-02 RX ADMIN — Medication 100 MILLIGRAM(S): at 06:31

## 2017-10-02 RX ADMIN — Medication 500 MILLIGRAM(S): at 11:39

## 2017-10-02 RX ADMIN — MEROPENEM 200 MILLIGRAM(S): 1 INJECTION INTRAVENOUS at 06:30

## 2017-10-02 RX ADMIN — ERTAPENEM SODIUM 120 MILLIGRAM(S): 1 INJECTION, POWDER, LYOPHILIZED, FOR SOLUTION INTRAMUSCULAR; INTRAVENOUS at 09:29

## 2017-10-02 NOTE — PROGRESS NOTE ADULT - SUBJECTIVE AND OBJECTIVE BOX
Patient is a 22y old  Male who presents with a chief complaint of Acute Appendicitis (28 Sep 2017 11:34)    Being followed by ID for peritonitis, intra-abdominal abscess  No PMH, admitted with acute appendicitis. BCs from ED positive for E coli and on 9/28 for Bacteroides. He underwent laparoscopic appendectomy on 9/27, perforation was seen at base of appendix. Mild purulence in peritoneal cavity. Appendectomy performed and abdomen washed out. Umbilical skin wound left open and packed.  Post-op CT demonstrated 2 small intra-abdominal fluid collections near surgical bed.  Interval history: Remains afebrile  No acute events      ROS:  No cough,SOB,CP  No N/V/D./abd pain  No other complaints      Antimicrobials:    meropenem IVPB      meropenem IVPB 1000 milliGRAM(s) IV Intermittent every 8 hours      Vital Signs Last 24 Hrs  T(C): 36.8 (10-02-17 @ 05:40), Max: 37 (10-01-17 @ 16:06)  T(F): 98.3 (10-02-17 @ 05:40), Max: 98.6 (10-01-17 @ 16:06)  HR: 50 (10-02-17 @ 05:40) (50 - 72)  BP: 112/72 (10-02-17 @ 05:40) (112/72 - 140/78)  BP(mean): --  RR: 18 (10-02-17 @ 05:40) (18 - 18)  SpO2: 97% (10-02-17 @ 05:40) (97% - 99%)    Physical Exam:    Constitutional: Comfortable. Awake and alert  Eyes: PERRL EOMI. No discharge.  ENMT: No sinus tenderness.  No pharyngeal erythema.   Neck: Supple  Respiratory: Good air entry bilaterally, no wheezes, rhonchi, or crackles  Cardiovascular:S1 S2 wnl, No murmurs  Gastrointestinal: Soft , bowel sounds present, laparotomy wound with packing healing, lower wound with some erythema.  Genitourinary: No suprapubic tenderness. no CVA tenderness   Musculoskeletal: No joint swelling. No edema.  Vascular: peripheral pulses felt  Neurological: No grossly focal deficits  Skin: No rash   Lymph Nodes: No palpable Lymphadenopathy   Psychiatric: Affect normal, A/O X 3    Lab Data:                          12.2   8.0   )-----------( 136      ( 30 Sep 2017 06:56 )             37.2       10-01    141  |  103  |  7   ----------------------------<  109<H>  4.2   |  26  |  0.79    Ca    9.2      01 Oct 2017 06:42  Phos  4.3     10-01  Mg     2.2     10-01 Patient is a 22y old  Male who presents with a chief complaint of Acute Appendicitis (28 Sep 2017 11:34)    Being followed by ID for peritonitis, intra-abdominal abscess  No PMH, admitted with acute appendicitis. BCs from ED positive for E coli and on 9/28 for Bacteroides. He underwent laparoscopic appendectomy on 9/27, perforation was seen at base of appendix. Mild purulence in peritoneal cavity. Appendectomy performed and abdomen washed out. Umbilical skin wound left open and packed.  Post-op CT demonstrated 2 small intra-abdominal fluid collections near surgical bed.  Interval history: Remains afebrile  No acute events      ROS:  No cough,SOB,CP  No N/V/D./abd pain  No other complaints      Antimicrobials:    meropenem IVPB      meropenem IVPB 1000 milliGRAM(s) IV Intermittent every 8 hours      Vital Signs Last 24 Hrs  T(C): 36.8 (10-02-17 @ 05:40), Max: 37 (10-01-17 @ 16:06)  T(F): 98.3 (10-02-17 @ 05:40), Max: 98.6 (10-01-17 @ 16:06)  HR: 50 (10-02-17 @ 05:40) (50 - 72)  BP: 112/72 (10-02-17 @ 05:40) (112/72 - 140/78)  BP(mean): --  RR: 18 (10-02-17 @ 05:40) (18 - 18)  SpO2: 97% (10-02-17 @ 05:40) (97% - 99%)    Physical Exam:    Constitutional: Comfortable. Awake and alert  Eyes: PERRL EOMI. No discharge.  ENMT: No sinus tenderness.  No pharyngeal erythema.   Neck: Supple  Respiratory: Good air entry bilaterally, no wheezes, rhonchi, or crackles  Cardiovascular:S1 S2 wnl, No murmurs  Gastrointestinal: Soft , bowel sounds present, laparotomy wound with packing healing, lower wound resolved erythema.  Genitourinary: No suprapubic tenderness. no CVA tenderness   Musculoskeletal: No joint swelling. No edema.  Vascular: peripheral pulses felt  Neurological: No grossly focal deficits  Skin: No rash   Lymph Nodes: No palpable Lymphadenopathy   Psychiatric: Affect normal, A/O X 3    Lab Data:                          12.2   8.0   )-----------( 136      ( 30 Sep 2017 06:56 )             37.2       10-01    141  |  103  |  7   ----------------------------<  109<H>  4.2   |  26  |  0.79    Ca    9.2      01 Oct 2017 06:42  Phos  4.3     10-01  Mg     2.2     10-01

## 2017-10-02 NOTE — CONSULT NOTE ADULT - SUBJECTIVE AND OBJECTIVE BOX
Interventional Radiology Consult Note    HPI:    This is a 22y Male with perforated appendicitis s/p laparoscopic appendectomy and washout on 9/27/17. Interventional Radiology called to assess patient for pelvic collection seen on post-op CT scan 9/30.    PAST MEDICAL & SURGICAL HISTORY:  No pertinent past medical history  No significant past surgical history       Allergies: No Known Allergies    Medications: Ertapenam IV    Vitals: 98.3F, HR 50 bpm, /72, RR 18, O2 Sat 97% on RA    LABS:    10-01    141  |  103  |  7   ----------------------------<  109<H>  4.2   |  26  |  0.79    Ca    9.2      01 Oct 2017 06:42  Phos  4.3     10-01  Mg     2.2     10-01    WBC: 8.0  H/Hct: 12.2/37.2  Plts: 136    IMAGING:  CT Abd pelvis 9/30: 2 small pelvic collections measuring up to 4.1 x 2.2cm

## 2017-10-02 NOTE — CONSULT NOTE ADULT - ASSESSMENT
22y Male with perforated appendicitis s/p laparoscopic appendectomy and washout on 9/27/17.    A/P:  No drainage indicated at this time as patient is asymptomatic.  Continue medical management as per ID.  If patient becomes septic, please call IR again and will reassess.

## 2017-10-02 NOTE — PROGRESS NOTE ADULT - ASSESSMENT
A/P: 22y Male s/p lap. appendectomy. Pain control adequate. Tolerating regular diet. Voiding appropriately. E. coli found on initial blood cultures. Bacteroides fragilis in subsequent. Now negative cultures x2 days    - Change meropenem to ertapenem per ID  - F/u w/ IR regarding possible drainage  - Diet: maintain regular diet  - Activity: OOB as tolerated  - Pain control  - DVT ppx  - Dispo: D/c planning pending abx recs from SOBEIDA lBack M.D.

## 2017-10-02 NOTE — PROGRESS NOTE ADULT - ASSESSMENT
22 year old male with no significant PMH presented with acute onset of abdominal pain progressive to localizing in the RLQ, fever, polymicrobial bacteremia, CT C/W appendicitis, now S/P lap appendectomy with perforation and intra-abdominal purulence noted. . Afebrile after 9/28, no elevated WBC. Tolerating advancing diet. On zosyn 9/27-9/30, antibiotic changed to meropenem on 9/30.  CT 9/30 demonstrated 2 small fluid collections,   Discuss draining collections with IR (largest up to 4 cm), send drainage for culture.  No new cultures.  Recommend change meropenem to ertapenem 1 gram every 24 hours.    Barrett Angel MD  pager [phone]  office [phone] 22 year old male with no significant PMH presented with acute onset of abdominal pain progressive to localizing in the RLQ, fever, polymicrobial bacteremia, CT C/W appendicitis, now S/P lap appendectomy with perforation and intra-abdominal purulence noted. . Afebrile after 9/28, no elevated WBC. Tolerating advancing diet. On zosyn 9/27-9/30, antibiotic changed to meropenem on 9/30.  CT 9/30 demonstrated 2 small fluid collections,   Discuss draining collections with IR (largest up to 4 cm), send drainage for culture, may need further imaging as collections evolve.  No new cultures.  Recommend change meropenem to ertapenem 1 gram every 24 hours.    Barrett Angel MD  pager [phone]  office [phone]

## 2017-10-02 NOTE — PROGRESS NOTE ADULT - SUBJECTIVE AND OBJECTIVE BOX
ATP Progress Note    S: No acute events overnight. Patient resting comfortably in bed. Pain well controlled. Denies fevers/chills and N/V.    O:  T(C): 36.8 (10-02-17 @ 05:40), Max: 37 (10-01-17 @ 16:06)  HR: 50 (10-02-17 @ 05:40) (50 - 72)  BP: 112/72 (10-02-17 @ 05:40) (112/72 - 140/78)  RR: 18 (10-02-17 @ 05:40) (18 - 18)  SpO2: 97% (10-02-17 @ 05:40) (97% - 99%)  Wt(kg): --    10-01 @ 07:01  -  10-02 @ 07:00  --------------------------------------------------------  IN:    Oral Fluid: 1680 mL  Total IN: 1680 mL    OUT:    Voided: 800 mL  Total OUT: 800 mL    Total NET: 880 mL          CAPILLARY BLOOD GLUCOSE        PHYSICAL EXAM:  General: laying comfortably in bed, NAD  Abdominal: belly soft, NT, ND  Extremities: No edema, + peripheral pulses  Skin: midline incision packed with Kerlix and covered with gauze, port sites C/D/I

## 2017-10-02 NOTE — PROGRESS NOTE ADULT - ATTENDING COMMENTS
Pt seen and examined.  Chart reviewed.  Resident note confirmed.  Pt is a 22 year old male s/p lap. Appendectomy for perforated appendicitis.  Pt continues to improve clinically.  Blood cultures returned positive for E. coli.  Abx were continued and repeat cultures were sent.    A/P  - Post op pain, continue PRN pain meds  - Atelectasis, ISP  - Continue regular diet  - Continue zosyn 2/2 perforated appendix + abdominal washout  - Activity: OOB as tolerated  - DVT ppx  - CT abdomen planned.
Pt seen and examined.  Chart reviewed.  Resident note confirmed.  Pt is a 22 year old male s/p lap. Appendectomy for perforated appendicitis.  Pt continues to improve clinically.  Repeat Blood cultures returned positive for E. coli.  Abx were modified and repeat cultures were sent.  Repeat CT abd is planned.  ID consult called    A/P  - Post op pain, continue PRN pain meds  - Atelectasis, ISP  - Continue regular diet  - Change to meropenem for perforated appendix with persistent bacteremia.  CT abdomen today  - Activity: OOB as tolerated  - DVT ppx
seen and examined 10/2/2017 @ 1000    POD#5 s/p laparoscopic appendectomy for perforated appendicitis    abd soft / NT / ND    E coli bacteremia now resolved  -ABx as per ID, currently on ertapenem
S/p appendectomy  doing well  Change to PO abx  D/c planning

## 2017-10-04 LAB
CULTURE RESULTS: SIGNIFICANT CHANGE UP
SPECIMEN SOURCE: SIGNIFICANT CHANGE UP

## 2017-10-05 PROBLEM — Z00.00 ENCOUNTER FOR PREVENTIVE HEALTH EXAMINATION: Status: ACTIVE | Noted: 2017-10-05

## 2017-10-05 LAB
CULTURE RESULTS: SIGNIFICANT CHANGE UP
CULTURE RESULTS: SIGNIFICANT CHANGE UP
SPECIMEN SOURCE: SIGNIFICANT CHANGE UP
SPECIMEN SOURCE: SIGNIFICANT CHANGE UP

## 2017-10-09 LAB — SURGICAL PATHOLOGY STUDY: SIGNIFICANT CHANGE UP

## 2017-10-13 ENCOUNTER — APPOINTMENT (OUTPATIENT)
Dept: TRAUMA SURGERY | Facility: CLINIC | Age: 22
End: 2017-10-13
Payer: MEDICAID

## 2017-10-13 VITALS
WEIGHT: 315 LBS | BODY MASS INDEX: 42.66 KG/M2 | SYSTOLIC BLOOD PRESSURE: 112 MMHG | HEIGHT: 72 IN | TEMPERATURE: 98.4 F | DIASTOLIC BLOOD PRESSURE: 72 MMHG | HEART RATE: 64 BPM

## 2017-10-13 PROCEDURE — 99024 POSTOP FOLLOW-UP VISIT: CPT

## 2017-10-27 ENCOUNTER — APPOINTMENT (OUTPATIENT)
Dept: TRAUMA SURGERY | Facility: CLINIC | Age: 22
End: 2017-10-27
Payer: MEDICAID

## 2017-10-27 VITALS
HEART RATE: 60 BPM | TEMPERATURE: 98.1 F | WEIGHT: 315 LBS | BODY MASS INDEX: 42.66 KG/M2 | DIASTOLIC BLOOD PRESSURE: 79 MMHG | SYSTOLIC BLOOD PRESSURE: 133 MMHG | HEIGHT: 72 IN

## 2017-10-27 PROCEDURE — 99024 POSTOP FOLLOW-UP VISIT: CPT

## 2017-10-27 RX ORDER — OXYCODONE 5 MG/1
5 TABLET ORAL
Refills: 0 | Status: ACTIVE | COMMUNITY

## 2018-12-01 ENCOUNTER — OUTPATIENT (OUTPATIENT)
Dept: OUTPATIENT SERVICES | Facility: HOSPITAL | Age: 23
LOS: 1 days | End: 2018-12-01
Payer: MEDICAID

## 2018-12-01 PROCEDURE — G9001: CPT

## 2018-12-11 DIAGNOSIS — Z71.89 OTHER SPECIFIED COUNSELING: ICD-10-CM

## 2020-04-04 NOTE — ED PROVIDER NOTE - CONSTITUTIONAL [-], MLM
inspected. In a similar fashion, a 10-mm port was inserted in the subxiphoid area and 2 more 5-mm ports were inserted in the right upper quadrant. Atraumatic graspers were then inserted through the lateral port and a [place] grasper was inserted through the subxiphoid. Electrocautery and the [place] grasper were used to dissect the gallbladder off its peritoneal attachments. The base of the gallbladder was then identified and the cystic duct was skeletonized. The cystic artery was then skeletonized in similar fashion. After obtaining our critical view of safety, Three clips were placed on the cystic duct distal and one proximal. The duct was transected with laparoscopic scissors. The cystic artery was clipped in a similar fashion with 1 clip proximal, 2 clips distal, and transected with laparoscopic scissors. Electrocautery was then used to dissect the gallbladder off the gallbladder fossa. After freeing up the gallbladder from its attachments, it was placed in a laparoscopic bag and removed through the 10-mm port site. Bleeding points on the liver bed were cauterized. A piece of surgical snow was placed in the gallbladder fossa. Hemostasis was observed. Clips remained in place. The fascia at the 10 mm port site was closed with an interrupted Vicryl suture using the Paul-Cheli device. The abdomen was decompressed and the remaining ports were removed. The skin was then closed with 4-0 Vicryl inverted interrupted dermal sutures. The port sites were infiltrated with local anesthetic. The incisions were cleaned and dried, and Dermaflex was applied. Needle, sponge, and instrument counts were reported as correct x2. The patient tolerated the procedure well without complications. Dr. Jennifer Mendiola was present and scrubbed throughout the case. DISPOSITION: Anesthesia was discontinued and the patient was extubated prior to leaving the OR. He was transferred to the recovery area in good condition.  Discharge to home is expected following appropriate post-anesthesia recovery.     Electronically signed by Daja Wahl MD on 4/4/20 at 12:39 AM EDT no night sweats/no weight loss

## 2020-11-30 NOTE — DISCHARGE NOTE ADULT - REASON FOR ADMISSION
Morphine        Last Written Prescription Date:  11/2/2020  Last Fill Quantity: 60,   # refills: 0  Last Office Visit: 8/19/2020  Future Office visit:       Routing refill request to provider for review/approval because:  Drug not on the [facility], [facility] or [facility] refill protocol or controlled substance    
Acute Appendicitis

## 2023-04-04 NOTE — BRIEF OPERATIVE NOTE - PRIMARY SURGEON
Jose Roberto Orbicularis Oris Muscle Flap Text: The defect edges were debeveled with a #15 scalpel blade.  Given that the defect affected the competency of the oral sphincter an obicularis oris muscle flap was deemed most appropriate to restore this competency and normal muscle function.  Using a sterile surgical marker, an appropriate flap was drawn incorporating the defect. The area thus outlined was incised with a #15 scalpel blade.

## 2024-06-10 ENCOUNTER — NON-APPOINTMENT (OUTPATIENT)
Age: 29
End: 2024-06-10

## 2024-06-11 ENCOUNTER — NON-APPOINTMENT (OUTPATIENT)
Age: 29
End: 2024-06-11

## 2025-03-09 NOTE — PATIENT PROFILE ADULT. - NS PRO TALK SOMEONE YN
Pt here for L femer fracture from [facility], states multiple falls on the 5th and 6th, did an XR which showed L femur neck fracture. Pt Aox1 and is Aox1 at [facility]. CCA DNI, has an AV fistula which doesn't work well per snf, so pt has a perma cath. Dialysis T,R,Sat.    no